# Patient Record
Sex: MALE | Race: WHITE | NOT HISPANIC OR LATINO | Employment: FULL TIME | URBAN - METROPOLITAN AREA
[De-identification: names, ages, dates, MRNs, and addresses within clinical notes are randomized per-mention and may not be internally consistent; named-entity substitution may affect disease eponyms.]

---

## 2020-10-26 ENCOUNTER — OFFICE VISIT (OUTPATIENT)
Dept: INTERNAL MEDICINE | Facility: CLINIC | Age: 35
End: 2020-10-26

## 2020-10-26 VITALS
RESPIRATION RATE: 17 BRPM | HEART RATE: 101 BPM | TEMPERATURE: 98.3 F | WEIGHT: 185 LBS | SYSTOLIC BLOOD PRESSURE: 140 MMHG | BODY MASS INDEX: 27.4 KG/M2 | HEIGHT: 69 IN | DIASTOLIC BLOOD PRESSURE: 88 MMHG | OXYGEN SATURATION: 97 %

## 2020-10-26 DIAGNOSIS — Z13.6 ENCOUNTER FOR LIPID SCREENING FOR CARDIOVASCULAR DISEASE: ICD-10-CM

## 2020-10-26 DIAGNOSIS — Z13.220 ENCOUNTER FOR LIPID SCREENING FOR CARDIOVASCULAR DISEASE: ICD-10-CM

## 2020-10-26 DIAGNOSIS — K58.2 IRRITABLE BOWEL SYNDROME WITH BOTH CONSTIPATION AND DIARRHEA: ICD-10-CM

## 2020-10-26 DIAGNOSIS — Z76.89 ENCOUNTER TO ESTABLISH CARE: Primary | ICD-10-CM

## 2020-10-26 DIAGNOSIS — L03.115 CELLULITIS OF RIGHT LEG: ICD-10-CM

## 2020-10-26 DIAGNOSIS — R03.0 BLOOD PRESSURE ELEVATED WITHOUT HISTORY OF HTN: ICD-10-CM

## 2020-10-26 DIAGNOSIS — N31.9 NEUROGENIC BLADDER: ICD-10-CM

## 2020-10-26 DIAGNOSIS — Q05.7 SPINA BIFIDA OF LUMBAR REGION WITHOUT HYDROCEPHALUS (HCC): ICD-10-CM

## 2020-10-26 DIAGNOSIS — Z00.00 HEALTHCARE MAINTENANCE: ICD-10-CM

## 2020-10-26 PROCEDURE — 99204 OFFICE O/P NEW MOD 45 MIN: CPT | Performed by: NURSE PRACTITIONER

## 2020-10-26 RX ORDER — CLINDAMYCIN HYDROCHLORIDE 150 MG/1
150 CAPSULE ORAL 3 TIMES DAILY
COMMUNITY
End: 2020-12-07

## 2020-10-26 NOTE — PROGRESS NOTES
"Subjective   Alonzo Haque is a 35 y.o. male.   Chief Complaint   Patient presents with   • Establish Care     Pt presents here today to establish care.   Irritable bowel, needs order for wheelchair measurements, spina bifida, cellulitis of right lower extremity    Patient presents to establish care.  This is a 35-year-old male.  This patient is new to me.  Reports that he does not have a previous PCP.  Reports that he was released from skilled nursing last week.    Prior to his release from skilled nursing he was being treated by the physicians there for right lower extremity cellulitis.  Per this documentation, he was treated with IM Rocephin as well as a course of p.o. Bactrim.  When he was released he went home with a 7-day course of clindamycin and states that he has about 4 days left of this course of treatment.  He denies fever or chills and states that the redness is starting to subside slightly.  He does have some fluid-filled blisters to the posterior ankle which \"have not healed up yet.\"    Endorses a history of hyperlipidemia and states \"at some point I was prescribed atorvastatin but I did not take this as I was told it could cause muscle cramps.\"    He has alternating constipation and diarrhea.  Does not take a daily probiotic.  Denies seeing blood or mucus in his stool.    He has spina bifida and reports that he has been in a wheelchair for the past 24 years.  Reports that he needs in order to get measured for a new wheelchair today.  He does have neurogenic bladder related to this underlying issue and self caths.  Prior to his present stay states that he was following with a urologist regularly but needs a referral for a new urologist today.    Reports that he has had a flu shot this year already.    Reports that he needs a letter written stating that he can \"make my own financial decisions.\"  Reports that prior to being in skilled nursing he was having his mother make financial decisions.  Denies any psychiatric history.  States " "that as a child he had a learning disability.  He does drive a car and is living on his own now.  Denies any known underlying medical or psychiatric issues that would impede his ability to make financial decisions.    Denies development of any other new issues today.       The following portions of the patient's history were reviewed and updated as appropriate: allergies, current medications, past family history, past medical history, past social history, past surgical history and problem list.    Review of Systems   Constitutional: Negative for activity change, chills, fatigue, fever, unexpected weight gain and unexpected weight loss.   HENT: Negative for congestion, hearing loss, postnasal drip, sinus pressure, sneezing, sore throat, swollen glands and tinnitus.    Eyes: Negative for photophobia, pain and visual disturbance.   Respiratory: Negative for cough, chest tightness, shortness of breath and wheezing.    Cardiovascular: Negative for chest pain, palpitations and leg swelling.   Gastrointestinal: Negative for abdominal distention, abdominal pain, constipation, diarrhea, nausea and vomiting.   Endocrine: Negative for polydipsia, polyphagia and polyuria.   Genitourinary: Negative for dysuria, frequency, hematuria and urgency.   Musculoskeletal: Positive for joint swelling (  Right ankle related to cellulitis  ).   Skin: Positive for color change.   Neurological: Negative for dizziness, weakness, numbness and headache.   All other systems reviewed and are negative.      Objective    /88   Pulse 101   Temp 98.3 °F (36.8 °C) (Oral)   Resp 17   Ht 175.3 cm (69\")   Wt 83.9 kg (185 lb)   SpO2 97%   BMI 27.32 kg/m²     Physical Exam  Vitals signs and nursing note reviewed.   Constitutional:       General: He is not in acute distress.     Appearance: Normal appearance. He is well-developed. He is not ill-appearing, toxic-appearing or diaphoretic.   HENT:      Head: Normocephalic and atraumatic.      Nose: " Nose normal.      Mouth/Throat:      Mouth: Mucous membranes are moist.      Pharynx: Oropharynx is clear.   Eyes:      Extraocular Movements: Extraocular movements intact.      Conjunctiva/sclera: Conjunctivae normal.      Pupils: Pupils are equal, round, and reactive to light.   Neck:      Musculoskeletal: Normal range of motion and neck supple. No neck rigidity or muscular tenderness.      Vascular: No carotid bruit.   Cardiovascular:      Rate and Rhythm: Normal rate and regular rhythm.      Pulses: Normal pulses.      Heart sounds: Normal heart sounds.   Pulmonary:      Effort: Pulmonary effort is normal. No respiratory distress.      Breath sounds: Normal breath sounds. No stridor. No wheezing, rhonchi or rales.   Chest:      Chest wall: No tenderness.   Abdominal:      General: Bowel sounds are normal. There is no distension.      Palpations: Abdomen is soft. There is no mass.      Tenderness: There is no abdominal tenderness. There is no right CVA tenderness, left CVA tenderness, guarding or rebound.      Hernia: No hernia is present.   Musculoskeletal: Normal range of motion.      Comments: Atrophied muscles, bilateral thighs and calves   Lymphadenopathy:      Cervical: No cervical adenopathy.   Skin:     General: Skin is warm and dry.      Capillary Refill: Capillary refill takes less than 2 seconds.      Comments: Right lower leg/ankle erythema.  Right posterior ankle fluid-filled blistering.  No purulent discharge.   Neurological:      General: No focal deficit present.      Mental Status: He is alert and oriented to person, place, and time.   Psychiatric:         Mood and Affect: Mood normal.         Behavior: Behavior normal.         Thought Content: Thought content normal.         Judgment: Judgment normal.       Current outpatient and discharge medications have been reconciled for the patient.  Reviewed by: YUMIKO Diamond      Assessment/Plan   Diagnoses and all orders for this visit:    1.  Encounter to establish care (Primary)    2. Blood pressure elevated without history of HTN  -     CBC No Differential  -     Comprehensive metabolic panel  -     Lipid panel  -     TSH Rfx On Abnormal To Free T4    3. Healthcare maintenance  -     CBC No Differential  -     Comprehensive metabolic panel  -     TSH Rfx On Abnormal To Free T4  -     Hepatitis C antibody    4. Encounter for lipid screening for cardiovascular disease  -     Lipid panel    5. Spina bifida of lumbar region without hydrocephalus (CMS/HCC)    6. Neurogenic bladder  -     Ambulatory Referral to Urology    7. Cellulitis of right leg  -     Ambulatory Referral to Wound Clinic    8. Irritable bowel syndrome with both constipation and diarrhea    Other orders  -     Cancel: Urinalysis With Culture If Indicated - Urine, Clean Catch      -Establish care: Reviewed patient's stated medical, surgical and social history.  Provided health maintenance recommendations.  One-time hep C screen today.  He has had his flu shot this year already.    -Blood pressure slightly high today at 140/88.  I have asked him to monitor blood pressure a few times per week for goal of less than 130/80 and to lower dietary sodium intake.    -Screening for hyperlipidemia: Lipid panel today.  Reports that he has been prescribed atorvastatin in the past but did not take this due to potential side effects.  We will check his lipid panel today and adjust therapy if needed.    -Spina bifida: Reports that he has had to use a wheelchair for the past 24 years due to mobility related to underlying spina bifida.  Current wheelchair is broken down and he requests orders for measurements for new wheelchair.  We will get these orders faxed to medical supply store.  Patient is unable to safely use a cane or walker due to spina bifida diagnosis and wheelchair is needed to assist with daily living activities inside the home.  Patient has upper body strength and mental capability to propel  the wheelchair inside the home.    -He has associated neurogenic bladder related to underlying spina bifida.  Referral to urology is entered.  He does a straight cath.    -Cellulitis of right leg: He will finish out the complete course of clindamycin.  I have referred to wound care clinic for further evaluation and treatment regarding possible dressings.    -Irritable bowel syndrome: I recommended a daily probiotic.  He may use MiraLAX if needed upon constipation.    -He requested a letter stating that he has cognitive ability to make his own financial decisions.  I have no prior documentation from any previous primary care providers or specialists.  Based upon my conversation and assessment with patient today I believe it is reasonable to assume that he may make his own financial decisions.  Reports that he is going to be living on his own and does drive.  I have written a letter accordingly and this is provided to the patient, signed.    We will contact patient with his lab results and any further recommendations.  Follow-up as needed and in 6 months for a physical.

## 2020-10-27 LAB
ALBUMIN SERPL-MCNC: 3.9 G/DL (ref 3.5–5.2)
ALBUMIN/GLOB SERPL: 1.4 G/DL
ALP SERPL-CCNC: 71 U/L (ref 39–117)
ALT SERPL-CCNC: 18 U/L (ref 1–41)
AST SERPL-CCNC: 17 U/L (ref 1–40)
BILIRUB SERPL-MCNC: 0.2 MG/DL (ref 0–1.2)
BUN SERPL-MCNC: 8 MG/DL (ref 6–20)
BUN/CREAT SERPL: 11.9 (ref 7–25)
CALCIUM SERPL-MCNC: 9 MG/DL (ref 8.6–10.5)
CHLORIDE SERPL-SCNC: 103 MMOL/L (ref 98–107)
CHOLEST SERPL-MCNC: 209 MG/DL (ref 0–200)
CO2 SERPL-SCNC: 26.5 MMOL/L (ref 22–29)
CREAT SERPL-MCNC: 0.67 MG/DL (ref 0.76–1.27)
ERYTHROCYTE [DISTWIDTH] IN BLOOD BY AUTOMATED COUNT: 12.2 % (ref 12.3–15.4)
GLOBULIN SER CALC-MCNC: 2.8 GM/DL
GLUCOSE SERPL-MCNC: 89 MG/DL (ref 65–99)
HCT VFR BLD AUTO: 41.2 % (ref 37.5–51)
HCV AB S/CO SERPL IA: <0.1 S/CO RATIO (ref 0–0.9)
HDLC SERPL-MCNC: 53 MG/DL (ref 40–60)
HGB BLD-MCNC: 13.6 G/DL (ref 13–17.7)
LDLC SERPL CALC-MCNC: 143 MG/DL (ref 0–100)
MCH RBC QN AUTO: 29.5 PG (ref 26.6–33)
MCHC RBC AUTO-ENTMCNC: 33 G/DL (ref 31.5–35.7)
MCV RBC AUTO: 89.4 FL (ref 79–97)
PLATELET # BLD AUTO: 425 10*3/MM3 (ref 140–450)
POTASSIUM SERPL-SCNC: 4.4 MMOL/L (ref 3.5–5.2)
PROT SERPL-MCNC: 6.7 G/DL (ref 6–8.5)
RBC # BLD AUTO: 4.61 10*6/MM3 (ref 4.14–5.8)
SODIUM SERPL-SCNC: 139 MMOL/L (ref 136–145)
TRIGL SERPL-MCNC: 70 MG/DL (ref 0–150)
TSH SERPL DL<=0.005 MIU/L-ACNC: 1.56 UIU/ML (ref 0.27–4.2)
VLDLC SERPL CALC-MCNC: 13 MG/DL (ref 5–40)
WBC # BLD AUTO: 5.91 10*3/MM3 (ref 3.4–10.8)

## 2020-10-27 NOTE — PROGRESS NOTES
Please notify patient that his blood count is stable with no anemia.  Metabolic panel stable including kidney, liver function and electrolytes.  Cholesterol panel showing slightly elevated total cholesterol and high LDL cholesterol although nonfasting sample may have contributed to this result.  Please watch dietary intake of carbs, sugars and fats and we will recheck regularly.  Thyroid is normal.  Please do follow-up as soon as possible with the wound care clinic per the referral that I entered yesterday.  I had wanted him to schedule a 6-month follow-up for physical, if he is agreeable please make this appointment for the future.  Thank you

## 2020-10-30 ENCOUNTER — OFFICE VISIT (OUTPATIENT)
Dept: WOUND CARE | Facility: HOSPITAL | Age: 35
End: 2020-10-30

## 2020-10-30 PROCEDURE — G0463 HOSPITAL OUTPT CLINIC VISIT: HCPCS

## 2020-11-05 ENCOUNTER — OFFICE VISIT (OUTPATIENT)
Dept: WOUND CARE | Facility: HOSPITAL | Age: 35
End: 2020-11-05

## 2020-11-05 PROCEDURE — G0463 HOSPITAL OUTPT CLINIC VISIT: HCPCS

## 2020-11-12 ENCOUNTER — OFFICE VISIT (OUTPATIENT)
Dept: WOUND CARE | Facility: HOSPITAL | Age: 35
End: 2020-11-12

## 2020-11-12 PROCEDURE — G0463 HOSPITAL OUTPT CLINIC VISIT: HCPCS

## 2020-12-07 ENCOUNTER — HOSPITAL ENCOUNTER (EMERGENCY)
Facility: HOSPITAL | Age: 35
Discharge: HOME | End: 2020-12-07
Attending: EMERGENCY MEDICINE | Admitting: EMERGENCY MEDICINE

## 2020-12-07 ENCOUNTER — APPOINTMENT (OUTPATIENT)
Dept: CT IMAGING | Facility: HOSPITAL | Age: 35
End: 2020-12-07

## 2020-12-07 VITALS
DIASTOLIC BLOOD PRESSURE: 85 MMHG | SYSTOLIC BLOOD PRESSURE: 124 MMHG | TEMPERATURE: 100.4 F | OXYGEN SATURATION: 96 % | RESPIRATION RATE: 18 BRPM | HEART RATE: 98 BPM

## 2020-12-07 DIAGNOSIS — N39.0 ACUTE UTI: Primary | ICD-10-CM

## 2020-12-07 DIAGNOSIS — R31.9 HEMATURIA, UNSPECIFIED TYPE: ICD-10-CM

## 2020-12-07 LAB
ALBUMIN SERPL-MCNC: 3.7 G/DL (ref 3.5–5.2)
ALBUMIN/GLOB SERPL: 1.2 G/DL
ALP SERPL-CCNC: 75 U/L (ref 39–117)
ALT SERPL W P-5'-P-CCNC: 52 U/L (ref 1–41)
ANION GAP SERPL CALCULATED.3IONS-SCNC: 8 MMOL/L (ref 5–15)
AST SERPL-CCNC: 37 U/L (ref 1–40)
BACTERIA UR QL AUTO: ABNORMAL /HPF
BASOPHILS # BLD AUTO: 0.08 10*3/MM3 (ref 0–0.2)
BASOPHILS NFR BLD AUTO: 0.4 % (ref 0–1.5)
BILIRUB SERPL-MCNC: 0.4 MG/DL (ref 0–1.2)
BILIRUB UR QL STRIP: NEGATIVE
BUN SERPL-MCNC: 18 MG/DL (ref 6–20)
BUN/CREAT SERPL: 23.7 (ref 7–25)
CALCIUM SPEC-SCNC: 8.6 MG/DL (ref 8.6–10.5)
CHLORIDE SERPL-SCNC: 99 MMOL/L (ref 98–107)
CLARITY UR: ABNORMAL
CO2 SERPL-SCNC: 25 MMOL/L (ref 22–29)
COLOR UR: YELLOW
CREAT SERPL-MCNC: 0.76 MG/DL (ref 0.76–1.27)
D-LACTATE SERPL-SCNC: 1.3 MMOL/L (ref 0.5–2)
DEPRECATED RDW RBC AUTO: 38.7 FL (ref 37–54)
EOSINOPHIL # BLD AUTO: 0 10*3/MM3 (ref 0–0.4)
EOSINOPHIL NFR BLD AUTO: 0 % (ref 0.3–6.2)
ERYTHROCYTE [DISTWIDTH] IN BLOOD BY AUTOMATED COUNT: 12.2 % (ref 12.3–15.4)
GFR SERPL CREATININE-BSD FRML MDRD: 117 ML/MIN/1.73
GLOBULIN UR ELPH-MCNC: 3.2 GM/DL
GLUCOSE SERPL-MCNC: 108 MG/DL (ref 65–99)
GLUCOSE UR STRIP-MCNC: NEGATIVE MG/DL
HCT VFR BLD AUTO: 38.1 % (ref 37.5–51)
HGB BLD-MCNC: 13.5 G/DL (ref 13–17.7)
HGB UR QL STRIP.AUTO: ABNORMAL
HYALINE CASTS UR QL AUTO: ABNORMAL /LPF
IMM GRANULOCYTES # BLD AUTO: 0.14 10*3/MM3 (ref 0–0.05)
IMM GRANULOCYTES NFR BLD AUTO: 0.7 % (ref 0–0.5)
KETONES UR QL STRIP: NEGATIVE
LEUKOCYTE ESTERASE UR QL STRIP.AUTO: ABNORMAL
LYMPHOCYTES # BLD AUTO: 0.43 10*3/MM3 (ref 0.7–3.1)
LYMPHOCYTES NFR BLD AUTO: 2.1 % (ref 19.6–45.3)
MCH RBC QN AUTO: 31.2 PG (ref 26.6–33)
MCHC RBC AUTO-ENTMCNC: 35.4 G/DL (ref 31.5–35.7)
MCV RBC AUTO: 88 FL (ref 79–97)
MONOCYTES # BLD AUTO: 1.09 10*3/MM3 (ref 0.1–0.9)
MONOCYTES NFR BLD AUTO: 5.2 % (ref 5–12)
NEUTROPHILS NFR BLD AUTO: 19.17 10*3/MM3 (ref 1.7–7)
NEUTROPHILS NFR BLD AUTO: 91.6 % (ref 42.7–76)
NITRITE UR QL STRIP: POSITIVE
NRBC BLD AUTO-RTO: 0 /100 WBC (ref 0–0.2)
PH UR STRIP.AUTO: 6 [PH] (ref 5–8)
PLATELET # BLD AUTO: 145 10*3/MM3 (ref 140–450)
PMV BLD AUTO: 10.9 FL (ref 6–12)
POTASSIUM SERPL-SCNC: 3.6 MMOL/L (ref 3.5–5.2)
PROT SERPL-MCNC: 6.9 G/DL (ref 6–8.5)
PROT UR QL STRIP: ABNORMAL
RBC # BLD AUTO: 4.33 10*6/MM3 (ref 4.14–5.8)
RBC # UR: ABNORMAL /HPF
REF LAB TEST METHOD: ABNORMAL
SODIUM SERPL-SCNC: 132 MMOL/L (ref 136–145)
SP GR UR STRIP: 1.01 (ref 1–1.03)
SQUAMOUS #/AREA URNS HPF: ABNORMAL /HPF
UROBILINOGEN UR QL STRIP: ABNORMAL
WBC # BLD AUTO: 20.91 10*3/MM3 (ref 3.4–10.8)
WBC UR QL AUTO: ABNORMAL /HPF

## 2020-12-07 PROCEDURE — 99283 EMERGENCY DEPT VISIT LOW MDM: CPT

## 2020-12-07 PROCEDURE — 36415 COLL VENOUS BLD VENIPUNCTURE: CPT

## 2020-12-07 PROCEDURE — P9612 CATHETERIZE FOR URINE SPEC: HCPCS

## 2020-12-07 PROCEDURE — 74176 CT ABD & PELVIS W/O CONTRAST: CPT

## 2020-12-07 PROCEDURE — 96365 THER/PROPH/DIAG IV INF INIT: CPT

## 2020-12-07 PROCEDURE — 83605 ASSAY OF LACTIC ACID: CPT | Performed by: NURSE PRACTITIONER

## 2020-12-07 PROCEDURE — 85025 COMPLETE CBC W/AUTO DIFF WBC: CPT | Performed by: NURSE PRACTITIONER

## 2020-12-07 PROCEDURE — 25010000003 CEFTRIAXONE PER 250 MG: Performed by: NURSE PRACTITIONER

## 2020-12-07 PROCEDURE — 81001 URINALYSIS AUTO W/SCOPE: CPT | Performed by: NURSE PRACTITIONER

## 2020-12-07 PROCEDURE — 80053 COMPREHEN METABOLIC PANEL: CPT | Performed by: NURSE PRACTITIONER

## 2020-12-07 RX ORDER — SODIUM CHLORIDE 0.9 % (FLUSH) 0.9 %
10 SYRINGE (ML) INJECTION AS NEEDED
Status: DISCONTINUED | OUTPATIENT
Start: 2020-12-07 | End: 2020-12-08 | Stop reason: HOSPADM

## 2020-12-07 RX ORDER — ONDANSETRON 4 MG/1
4 TABLET, ORALLY DISINTEGRATING ORAL EVERY 6 HOURS PRN
Qty: 12 TABLET | Refills: 0 | Status: SHIPPED | OUTPATIENT
Start: 2020-12-07 | End: 2021-10-04

## 2020-12-07 RX ORDER — CEFTRIAXONE SODIUM 2 G/50ML
2 INJECTION, SOLUTION INTRAVENOUS ONCE
Status: COMPLETED | OUTPATIENT
Start: 2020-12-07 | End: 2020-12-07

## 2020-12-07 RX ORDER — PROMETHAZINE HYDROCHLORIDE 25 MG/1
25 TABLET ORAL EVERY 6 HOURS PRN
Qty: 12 TABLET | Refills: 0 | Status: SHIPPED | OUTPATIENT
Start: 2020-12-07 | End: 2021-10-04

## 2020-12-07 RX ORDER — CEFUROXIME AXETIL 500 MG/1
500 TABLET ORAL 2 TIMES DAILY
Qty: 14 TABLET | Refills: 0 | Status: SHIPPED | OUTPATIENT
Start: 2020-12-07 | End: 2021-10-04

## 2020-12-07 RX ADMIN — CEFTRIAXONE SODIUM 2 G: 2 INJECTION, SOLUTION INTRAVENOUS at 22:37

## 2020-12-07 RX ADMIN — SODIUM CHLORIDE 1000 ML: 9 INJECTION, SOLUTION INTRAVENOUS at 21:43

## 2020-12-08 NOTE — ED PROVIDER NOTES
EMERGENCY DEPARTMENT ENCOUNTER    Room Number:  08/08  Date of encounter:  12/7/2020  PCP: Elaine Cheng APRN  Historian: Tawnya        PPE    Patient was placed in face mask in first look. Patient was wearing facemask when I entered the room and throughout our encounter. I wore full protective equipment throughout this patient encounter including a face mask, and gloves. Hand hygiene was performed before donning protective equipment and after removal when leaving the room.        HPI:  Chief Complaint: Back pain  A complete HPI/ROS/PMH/PSH/SH/FH are unobtainable due to: Nothing    Context: Alonzo Haque is a 35 y.o. male who arrives to the ED via private vehicle from home.  Patient presents with c/o intermittent, mild, right flank and lower back pain that began yesterday.   Patient also complains of self cathing and having blood in his urine since yesterday, nausea, vomiting and fever and chills.  Patient denies chest pain, shortness of breath, any other symptoms.  Patient states that he has a history of neurogenic bladder and self caths 3-4 times a day.  He states yesterday he noticed a large amount of blood.  He states he does get frequent urinary tract infections.  Patient states that nothing makes the symptoms better and nothing worsens symptoms.          PAST MEDICAL HISTORY  Active Ambulatory Problems     Diagnosis Date Noted   • Spina bifida of lumbar region without hydrocephalus (CMS/[facility]) 10/26/2020   • Neurogenic bladder 10/26/2020   • Irritable bowel syndrome with both constipation and diarrhea 10/26/2020     Resolved Ambulatory Problems     Diagnosis Date Noted   • No Resolved Ambulatory Problems     Past Medical History:   Diagnosis Date   • Abdominal pain    • Back pain    • Bilateral swelling of feet and ankles    • Constipation    • Diarrhea    • Difficulty walking    • History of chickenpox    • Muscle cramps    • Nervousness    • Urinary incontinence, post-void dribbling          PAST SURGICAL  HISTORY  No past surgical history on file.      FAMILY HISTORY  Family History   Problem Relation Age of Onset   • Lung cancer Father          SOCIAL HISTORY  Social History     Socioeconomic History   • Marital status: Single     Spouse name: Not on file   • Number of children: Not on file   • Years of education: Not on file   • Highest education level: Not on file   Tobacco Use   • Smoking status: Current Every Day Smoker     Packs/day: 0.25     Types: Cigarettes     Start date: 2001   • Smokeless tobacco: Never Used   Substance and Sexual Activity   • Alcohol use: Never     Frequency: Never   • Drug use: Not Currently         ALLERGIES  Latex and Penicillins        REVIEW OF SYSTEMS  Review of Systems     All systems reviewed and negative except for those discussed in HPI.        PHYSICAL EXAM    ED Triage Vitals [12/07/20 2055]   Temp Heart Rate Resp BP SpO2   100.4 °F (38 °C) 112 18 138/81 98 %       Physical Exam  GENERAL: Well appearing, non-toxic appearing, not distressed  HENT: normocephalic, atraumatic  EYES: no scleral icterus, PERRL  CV: regular rhythm, tachycardic, no murmur  RESPIRATORY: normal effort, CTAB  ABDOMEN: soft, normal bowel sounds, and nontender  No CVA tenderness le, right flank and lower back tenderness  MUSCULOSKELETAL: no deformity  NEURO: alert, moves all extremities, follows commands, mental status normal/baseline  SKIN: warm, dry, no rash   Psych: Appropriate mood and affect  Nursing notes and vital signs reviewed      LAB RESULTS  Recent Results (from the past 24 hour(s))   Comprehensive Metabolic Panel    Collection Time: 12/07/20  9:42 PM    Specimen: Blood   Result Value Ref Range    Glucose 108 (H) 65 - 99 mg/dL    BUN 18 6 - 20 mg/dL    Creatinine 0.76 0.76 - 1.27 mg/dL    Sodium 132 (L) 136 - 145 mmol/L    Potassium 3.6 3.5 - 5.2 mmol/L    Chloride 99 98 - 107 mmol/L    CO2 25.0 22.0 - 29.0 mmol/L    Calcium 8.6 8.6 - 10.5 mg/dL    Total Protein 6.9 6.0 - 8.5 g/dL    Albumin  3.70 3.50 - 5.20 g/dL    ALT (SGPT) 52 (H) 1 - 41 U/L    AST (SGOT) 37 1 - 40 U/L    Alkaline Phosphatase 75 39 - 117 U/L    Total Bilirubin 0.4 0.0 - 1.2 mg/dL    eGFR Non African Amer 117 >60 mL/min/1.73    Globulin 3.2 gm/dL    A/G Ratio 1.2 g/dL    BUN/Creatinine Ratio 23.7 7.0 - 25.0    Anion Gap 8.0 5.0 - 15.0 mmol/L   CBC Auto Differential    Collection Time: 12/07/20  9:42 PM    Specimen: Blood   Result Value Ref Range    WBC 20.91 (H) 3.40 - 10.80 10*3/mm3    RBC 4.33 4.14 - 5.80 10*6/mm3    Hemoglobin 13.5 13.0 - 17.7 g/dL    Hematocrit 38.1 37.5 - 51.0 %    MCV 88.0 79.0 - 97.0 fL    MCH 31.2 26.6 - 33.0 pg    MCHC 35.4 31.5 - 35.7 g/dL    RDW 12.2 (L) 12.3 - 15.4 %    RDW-SD 38.7 37.0 - 54.0 fl    MPV 10.9 6.0 - 12.0 fL    Platelets 145 140 - 450 10*3/mm3    Neutrophil % 91.6 (H) 42.7 - 76.0 %    Lymphocyte % 2.1 (L) 19.6 - 45.3 %    Monocyte % 5.2 5.0 - 12.0 %    Eosinophil % 0.0 (L) 0.3 - 6.2 %    Basophil % 0.4 0.0 - 1.5 %    Immature Grans % 0.7 (H) 0.0 - 0.5 %    Neutrophils, Absolute 19.17 (H) 1.70 - 7.00 10*3/mm3    Lymphocytes, Absolute 0.43 (L) 0.70 - 3.10 10*3/mm3    Monocytes, Absolute 1.09 (H) 0.10 - 0.90 10*3/mm3    Eosinophils, Absolute 0.00 0.00 - 0.40 10*3/mm3    Basophils, Absolute 0.08 0.00 - 0.20 10*3/mm3    Immature Grans, Absolute 0.14 (H) 0.00 - 0.05 10*3/mm3    nRBC 0.0 0.0 - 0.2 /100 WBC   Urinalysis With Microscopic If Indicated (No Culture) - Urine, Catheter    Collection Time: 12/07/20  9:52 PM    Specimen: Urine, Catheter   Result Value Ref Range    Color, UA Yellow Yellow, Straw    Appearance, UA Cloudy (A) Clear    pH, UA 6.0 5.0 - 8.0    Specific Gravity, UA 1.009 1.005 - 1.030    Glucose, UA Negative Negative    Ketones, UA Negative Negative    Bilirubin, UA Negative Negative    Blood, UA Large (3+) (A) Negative    Protein,  mg/dL (2+) (A) Negative    Leuk Esterase, UA Large (3+) (A) Negative    Nitrite, UA Positive (A) Negative    Urobilinogen, UA 0.2 E.U./dL 0.2 -  1.0 E.U./dL   Urinalysis, Microscopic Only - Urine, Catheter    Collection Time: 12/07/20  9:52 PM    Specimen: Urine, Catheter   Result Value Ref Range    RBC, UA Too Numerous to Count (A) None Seen, 0-2 /HPF    WBC, UA Too Numerous to Count (A) None Seen, 0-2 /HPF    Bacteria, UA 4+ (A) None Seen /HPF    Squamous Epithelial Cells, UA 0-2 None Seen, 0-2 /HPF    Hyaline Casts, UA None Seen None Seen /LPF    Methodology Automated Microscopy    Lactic Acid, Plasma    Collection Time: 12/07/20 10:23 PM    Specimen: Blood   Result Value Ref Range    Lactate 1.3 0.5 - 2.0 mmol/L       Ordered the above labs and independently reviewed the results.      RADIOLOGY  Ct Abdomen Pelvis Without Contrast    Result Date: 12/7/2020  CT ABDOMEN AND PELVIS WITHOUT CONTRAST:   HISTORY: Right flank pain  TECHNIQUE:  Contiguous 5 mm axial images were performed through the abdomen and pelvis without intravenous contrast and without oral contrast. Multiplanar reformatted images were reconstructed from the helical source data. Radiation dose reduction techniques were utilized, including automated exposure control and exposure modulation based on body size.   COMPARISON:  None  FINDINGS:   Note that the sensitivity for lesion detection in organs is diminished in the absence of intravenous contrast.  Lung bases: Punctate calcified granuloma in the right lung base. Scarring versus atelectasis at the bilateral lung bases. No pleural effusions.  Liver: Unremarkable.  Gallbladder: No calcified gallstones. No intrahepatic or extrahepatic biliary tree dilatation.  Spleen: Unremarkable. Normal in size.    Pancreas: Unremarkable.  Adrenal Glands: Unremarkable.  Kidneys/Ureter/Urinary Bladder: Horseshoe kidney. No hydronephrosis. No renal calculi or ureteral calculi are seen. No ureteral dilatation. The urinary bladder is unremarkable.  Vasculature: Abdominal aorta demonstrates normal caliber without evidence of aneurysm. IVC is unremarkable.  Bowel  and Mesentery:The stomach is unremarkable. The visualized bowel is normal in caliber without obstruction. Moderate stool within the distal sigmoid colon and rectum. There is no inflammatory change within the mesentery. No ascites.  No free air.  Lymphadenopathy: No retroperitoneal, mesenteric or pelvic lymphadenopathy.  Pelvic organs: Calcifications in the prostate  Peripheral soft tissues: A  shunt catheter extends along the right anterior chest and abdominal wall and enters the intracranial cavity and in the mid abdomen and terminates in the left lower quadrant.. Small fat-containing umbilical hernia. Atrophy of the bilateral gluteal muscular structure.  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 No acute osseous abnormality. No aggressive osseous lesions.         1.  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 2.  Thickened urinary bladder walls which may be due to decompression or cystitis. Correlate with urinalysis. 3.  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 Further evaluation with a lumbar spine MRI exam can be obtained, as clinically indicated..  This report was finalized on 12/7/2020 10:49 PM by Dr. Austin Day M.D.        I ordered the above noted radiological studies and viewed the images on the PACS system.       MEDICAL RECORD REVIEW  Medical records reviewed in epic      PROCEDURES    Procedures        DIFFERENTIAL DIAGNOSIS  Differential diagnosis include but are not limited to the following: Urinary tract infection, pyelonephritis, kidney stone, hematuria      PROGRESS, DATA ANALYSIS, CONSULTS, AND MEDICAL DECISION MAKING        ED Course as of Dec 07 2321   Mon Dec 07, 2020   2225 Discussed pertinent information from history and physical exam with patient.  Discussed differential diagnosis and plan for ED  evaluation/work-up and treatment including labs, UA, CT abdomen pelvis.  All questions answered.  Patient is agreeable with this plan.        [MS]   2225 Discussed with patient his penicillin allergy, he states he has had that since childhood.  He states that he has received Rocephin in the past and has not had any reactions after receiving that.    [MS]   2226 WBC(!): 20.91 [MS]   2226 Leukocytes, UA(!): Large (3+) [MS]   2226 Nitrite, UA(!): Positive [MS]   2226 RBC, UA(!): Too Numerous to Count [MS]   2226 WBC, UA(!): Too Numerous to Count [MS]   2226 Bacteria, UA(!): 4+ [MS]   2258 Reviewed pt's history and workup with Dr. Chiu.  After a bedside evaluation, they agree with the plan of care.          [MS]   2320 Discussed with patient results, including elevated white count, urinary tract infection.  Patient was offered admission based on these lab findings, however he states that he would prefer to go home and does not want to be admitted at this time.  Patient was given strict return to ER precautions.  Urine was sent for culture.    [MS]      ED Course User Index  [MS] Mimi Abbott APRN     Discussed plan for discharge, as there is no emergent indication for admission. Pt/family is agreeable and understands need for follow up and repeat testing.  Pt is aware that discharge does not mean that nothing is wrong but it indicates no emergency is present that requires admission and they must continue care with follow-up as given below or physician of their choice.   Patient/Family voiced understanding of above instructions.  Patient discharged in stable condition.    DIAGNOSIS  Final diagnoses:   Acute UTI   Hematuria, unspecified type       FOLLOW UP   Elaine Cheng APRN  [address] [facility] [address]  [facility] [address] [phone]    Call in 1 day        RX     Medication List      New Prescriptions    cefuroxime 500 MG tablet  Commonly known as: CEFTIN  Take 1 tablet by mouth 2 (Two) Times a  Day.     ondansetron ODT 4 MG disintegrating tablet  Commonly known as: ZOFRAN-ODT  Place 1 tablet on the tongue Every 6 (Six) Hours As Needed for Nausea or Vomiting.     promethazine 25 MG tablet  Commonly known as: PHENERGAN  Take 1 tablet by mouth Every 6 (Six) Hours As Needed for Nausea or Vomiting.        Stop    clindamycin 150 MG capsule  Commonly known as: CLEOCIN           Where to Get Your Medications      You can get these medications from any pharmacy    Bring a paper prescription for each of these medications  · cefuroxime 500 MG tablet  · ondansetron ODT 4 MG disintegrating tablet  · promethazine 25 MG tablet           MEDICATIONS GIVEN IN ED    Medications   sodium chloride 0.9 % flush 10 mL (has no administration in time range)   sodium chloride 0.9 % bolus 1,000 mL (0 mL Intravenous Stopped 12/7/20 2317)   cefTRIAXone (ROCEPHIN) IVPB 2 g (0 g Intravenous Stopped 12/7/20 2317)           COURSE & MEDICAL DECISION MAKING  Any/All labs and Any/All Imaging studies that were ordered were reviewed and are noted above.  Results were reviewed/discussed with the patient and they were also made aware of online assess.   Pt also made aware that some labs, such as cultures, will not be resulted during ER visit and follow up with PMD is necessary.        Mimi Abbott, APRN  12/07/20 1110

## 2020-12-08 NOTE — ED NOTES
Pt via PV from home escorted by family with c/o fever, mid-lower back pain, faitgue, N/V, blood in urine.   Pt reports taking tylenol this morning and prior to arrival but was unable to keep the most recent dose down.     Mask placed on pt, staff wearing appropriate PPE.     Leola Shah, RN  12/07/20 7300

## 2020-12-08 NOTE — ED PROVIDER NOTES
Brief history of present illness: 35-year-old male presents with hematuria and fever.  Patient concerned about UTI.  Patient does have a history of CP and self caths.  Patient did have 1 episode of vomiting.  Otherwise no cough, chest pain, loss of taste or smell.    Physical exam:    ED Triage Vitals [12/07/20 2055]   Temp Heart Rate Resp BP SpO2   100.4 °F (38 °C) 112 18 138/81 98 %      Temp src Heart Rate Source Patient Position BP Location FiO2 (%)   Tympanic -- -- -- --     Ill-appearing though not overtly toxic.  Patient is tachycardic but pink warm and well-perfused.  No respiratory distress is noted.  Abdomen is soft and nontender.  Normal mood and affect.    MDM:    Results for orders placed or performed during the hospital encounter of 12/07/20   Comprehensive Metabolic Panel    Specimen: Blood   Result Value Ref Range    Glucose 108 (H) 65 - 99 mg/dL    BUN 18 6 - 20 mg/dL    Creatinine 0.76 0.76 - 1.27 mg/dL    Sodium 132 (L) 136 - 145 mmol/L    Potassium 3.6 3.5 - 5.2 mmol/L    Chloride 99 98 - 107 mmol/L    CO2 25.0 22.0 - 29.0 mmol/L    Calcium 8.6 8.6 - 10.5 mg/dL    Total Protein 6.9 6.0 - 8.5 g/dL    Albumin 3.70 3.50 - 5.20 g/dL    ALT (SGPT) 52 (H) 1 - 41 U/L    AST (SGOT) 37 1 - 40 U/L    Alkaline Phosphatase 75 39 - 117 U/L    Total Bilirubin 0.4 0.0 - 1.2 mg/dL    eGFR Non African Amer 117 >60 mL/min/1.73    Globulin 3.2 gm/dL    A/G Ratio 1.2 g/dL    BUN/Creatinine Ratio 23.7 7.0 - 25.0    Anion Gap 8.0 5.0 - 15.0 mmol/L   Urinalysis With Microscopic If Indicated (No Culture) - Urine, Catheter    Specimen: Urine, Catheter   Result Value Ref Range    Color, UA Yellow Yellow, Straw    Appearance, UA Cloudy (A) Clear    pH, UA 6.0 5.0 - 8.0    Specific Gravity, UA 1.009 1.005 - 1.030    Glucose, UA Negative Negative    Ketones, UA Negative Negative    Bilirubin, UA Negative Negative    Blood, UA Large (3+) (A) Negative    Protein,  mg/dL (2+) (A) Negative    Leuk Esterase, UA Large (3+)  (A) Negative    Nitrite, UA Positive (A) Negative    Urobilinogen, UA 0.2 E.U./dL 0.2 - 1.0 E.U./dL   CBC Auto Differential    Specimen: Blood   Result Value Ref Range    WBC 20.91 (H) 3.40 - 10.80 10*3/mm3    RBC 4.33 4.14 - 5.80 10*6/mm3    Hemoglobin 13.5 13.0 - 17.7 g/dL    Hematocrit 38.1 37.5 - 51.0 %    MCV 88.0 79.0 - 97.0 fL    MCH 31.2 26.6 - 33.0 pg    MCHC 35.4 31.5 - 35.7 g/dL    RDW 12.2 (L) 12.3 - 15.4 %    RDW-SD 38.7 37.0 - 54.0 fl    MPV 10.9 6.0 - 12.0 fL    Platelets 145 140 - 450 10*3/mm3    Neutrophil % 91.6 (H) 42.7 - 76.0 %    Lymphocyte % 2.1 (L) 19.6 - 45.3 %    Monocyte % 5.2 5.0 - 12.0 %    Eosinophil % 0.0 (L) 0.3 - 6.2 %    Basophil % 0.4 0.0 - 1.5 %    Immature Grans % 0.7 (H) 0.0 - 0.5 %    Neutrophils, Absolute 19.17 (H) 1.70 - 7.00 10*3/mm3    Lymphocytes, Absolute 0.43 (L) 0.70 - 3.10 10*3/mm3    Monocytes, Absolute 1.09 (H) 0.10 - 0.90 10*3/mm3    Eosinophils, Absolute 0.00 0.00 - 0.40 10*3/mm3    Basophils, Absolute 0.08 0.00 - 0.20 10*3/mm3    Immature Grans, Absolute 0.14 (H) 0.00 - 0.05 10*3/mm3    nRBC 0.0 0.0 - 0.2 /100 WBC   Urinalysis, Microscopic Only - Urine, Catheter    Specimen: Urine, Catheter   Result Value Ref Range    RBC, UA Too Numerous to Count (A) None Seen, 0-2 /HPF    WBC, UA Too Numerous to Count (A) None Seen, 0-2 /HPF    Bacteria, UA 4+ (A) None Seen /HPF    Squamous Epithelial Cells, UA 0-2 None Seen, 0-2 /HPF    Hyaline Casts, UA None Seen None Seen /LPF    Methodology Automated Microscopy    Lactic Acid, Plasma    Specimen: Blood   Result Value Ref Range    Lactate 1.3 0.5 - 2.0 mmol/L     Patient would like to leave after his IV antibiotics and fluids are complete.  I did have a long discussion with him about indications for admission including his SIRS criteria and concern for rapid decline if infection is not brought under control.  He expressed understanding but continues to desire discharge following ED treatment.  He expressed understanding of the  risk of medical decline including the risk of death from leaving against advice.  He accepts this risk.  He is invited to return at anytime with worsening or continued symptoms.  Patient agreeable with plan for oral antibiotics and antiemetic.    I have seen and personally evaluated this patient, discussed the case with the treating advanced practice provider, and reviewed their note. I was involved in the medical decision making during the evaluation, testing and disposition planning for this patient.     Louie Chiu MD  12/07/20 [id number]

## 2020-12-08 NOTE — DISCHARGE INSTRUCTIONS
Medications as ordered  Rest, increase water intake  Urine culture back in 48 hours, will call if your antibiotic needs to be changed  Follow up with pmd in 7-10 days to have urine rechecked  Return to er for fever, chills, vomiting, diarrhea, abdominal pain, decreased urine output, or any new or worsening symptoms

## 2020-12-08 NOTE — ED NOTES
Pt AAOx4, ABC's intact, NAD noted at this time. Pt discharged c belongings and educational packet. PPE worn by this RN c pt wearing mask during encounters.      Maikel Biswas, OSMANI  12/07/20 0848

## 2020-12-09 ENCOUNTER — TELEPHONE (OUTPATIENT)
Dept: INTERNAL MEDICINE | Facility: CLINIC | Age: 35
End: 2020-12-09

## 2020-12-09 NOTE — TELEPHONE ENCOUNTER
Can we send this order to [facility]? Usually a urologist would write for this. If he doesn't have one, can we enter referral for Dr. Valle?

## 2020-12-09 NOTE — TELEPHONE ENCOUNTER
Patient's mom says patient needs an order for a Catheter 14 Uzbek 16 inch vinyl 4 times a day. She would like to  today by noon if possible.    Please advise  [phone]

## 2020-12-09 NOTE — TELEPHONE ENCOUNTER
Pt's Mother advised she is just needing a 1 week supply until she gets the order from mail order supplier  She is going to call Dr Valle office and get him radha Henry signed and it was faxed to [facility] [phone]

## 2020-12-09 NOTE — TELEPHONE ENCOUNTER
PATIENT IS JUST INFORMING HE WAS IN THE ER 12-7-20  FOR A UTI AND HE IS FEELING BETTER TODAY.    CALL BACK: [phone]

## 2021-04-26 ENCOUNTER — IMMUNIZATION (OUTPATIENT)
Dept: VACCINE CLINIC | Facility: HOSPITAL | Age: 36
End: 2021-04-26

## 2021-04-26 PROCEDURE — 91300 HC SARSCOV02 VAC 30MCG/0.3ML IM: CPT | Performed by: INTERNAL MEDICINE

## 2021-04-26 PROCEDURE — 0001A: CPT | Performed by: INTERNAL MEDICINE

## 2021-05-17 ENCOUNTER — IMMUNIZATION (OUTPATIENT)
Dept: VACCINE CLINIC | Facility: HOSPITAL | Age: 36
End: 2021-05-17

## 2021-05-17 PROCEDURE — 0002A: CPT | Performed by: INTERNAL MEDICINE

## 2021-05-17 PROCEDURE — 91300 HC SARSCOV02 VAC 30MCG/0.3ML IM: CPT | Performed by: INTERNAL MEDICINE

## 2021-06-15 ENCOUNTER — TELEPHONE (OUTPATIENT)
Dept: INTERNAL MEDICINE | Facility: CLINIC | Age: 36
End: 2021-06-15

## 2021-06-15 NOTE — TELEPHONE ENCOUNTER
Caller: Alonzo Haque    Relationship to patient: Self    Best call back number:[phone] (H)  Patient is needing: PATIENT CALLED IN STATING HE IS NEEDING A WORK ORDER FOR HIS WHEELCHAIR TO BE REPAIRED SENT OVER TO [facility]. STATED THAT HIS WHEELCHAIR HAS 2 FLAT TIRES AND NEEDS TO BE FIXED. PLEASE CALL PATIENT AND ADVISE. THANK YOU.

## 2021-06-16 NOTE — TELEPHONE ENCOUNTER
Hub staff attempted to follow warm transfer process and was unsuccessful     Caller: Alonzo Haque    Relationship to patient: Self    Best call back number: [phone]    Patient is needing: PATIENT IS FOLLOWING-UP ON THE STATUS OF THIS PRESCRIPTION. PLEASE ADVISE.

## 2021-06-16 NOTE — TELEPHONE ENCOUNTER
Patient notified rx for wheelchair repair has been sent to [facility]. Patient notified this has been faxed but he is due for physical with Elaine. He will call back to schedule once his wheelchair is repaired so that he can get to appointment.

## 2021-06-16 NOTE — TELEPHONE ENCOUNTER
I will hand-write script and bring to you, thanks. Could we also get him scheduled for a physical with me? I last saw Oct 2020 and no future apt scheduled.

## 2021-06-28 ENCOUNTER — TELEPHONE (OUTPATIENT)
Dept: INTERNAL MEDICINE | Facility: CLINIC | Age: 36
End: 2021-06-28

## 2021-06-28 NOTE — TELEPHONE ENCOUNTER
Caller: Alonzo Haque    Relationship: Self    Best call back number: [phone]     What orders are you requesting (i.e. lab or imaging): CATHETERS    In what timeframe would the patient need to come in: ASAP    Where will you receive your lab/imaging services:   [facility]  [address]  [place] [address]    Additional notes:

## 2021-07-02 ENCOUNTER — TELEPHONE (OUTPATIENT)
Dept: INTERNAL MEDICINE | Facility: CLINIC | Age: 36
End: 2021-07-02

## 2021-07-02 NOTE — TELEPHONE ENCOUNTER
Caller: Alonzo Haque    Relationship: Self    Best call back number: [phone]  What orders are you requesting (i.e. lab or imaging): CATHETERS 14 Palestinian    In what timeframe would the patient need to come in: N/A    [facility]    Additional notes: PATIENT NEEDS MIHIR SANTOS TO CALL IN CATHETERS FOR THE PATIENT AT [facility] [address] [place] [address]

## 2021-10-04 ENCOUNTER — OFFICE VISIT (OUTPATIENT)
Dept: INTERNAL MEDICINE | Facility: CLINIC | Age: 36
End: 2021-10-04

## 2021-10-04 VITALS
SYSTOLIC BLOOD PRESSURE: 137 MMHG | WEIGHT: 185 LBS | DIASTOLIC BLOOD PRESSURE: 92 MMHG | TEMPERATURE: 98.6 F | HEART RATE: 93 BPM | OXYGEN SATURATION: 98 % | BODY MASS INDEX: 27.4 KG/M2 | HEIGHT: 69 IN | RESPIRATION RATE: 17 BRPM

## 2021-10-04 DIAGNOSIS — Q05.7 SPINA BIFIDA OF LUMBAR REGION WITHOUT HYDROCEPHALUS (HCC): ICD-10-CM

## 2021-10-04 DIAGNOSIS — N31.9 NEUROGENIC BLADDER: ICD-10-CM

## 2021-10-04 DIAGNOSIS — Z00.00 ANNUAL PHYSICAL EXAM: Primary | ICD-10-CM

## 2021-10-04 DIAGNOSIS — Z00.00 HEALTHCARE MAINTENANCE: ICD-10-CM

## 2021-10-04 DIAGNOSIS — R05.3 CHRONIC COUGH: ICD-10-CM

## 2021-10-04 DIAGNOSIS — K21.9 GASTROESOPHAGEAL REFLUX DISEASE WITHOUT ESOPHAGITIS: ICD-10-CM

## 2021-10-04 DIAGNOSIS — F17.200 CURRENT SMOKER: ICD-10-CM

## 2021-10-04 DIAGNOSIS — E78.2 MIXED HYPERLIPIDEMIA: ICD-10-CM

## 2021-10-04 PROCEDURE — 99214 OFFICE O/P EST MOD 30 MIN: CPT | Performed by: NURSE PRACTITIONER

## 2021-10-04 RX ORDER — OMEPRAZOLE 40 MG/1
CAPSULE, DELAYED RELEASE ORAL
Qty: 90 CAPSULE | Refills: 1 | Status: SHIPPED | OUTPATIENT
Start: 2021-10-04 | End: 2022-06-16

## 2021-10-04 NOTE — PROGRESS NOTES
"Chief Complaint  Hyperlipidemia (6 month follow up)    Subjective          Alonzo Haque presents to [facility]  Patient presents for follow-up on chronic conditions.  This is a 36-year-old male.    He has a history of spina bifida and concurrent neurogenic bladder.  He uses 14 Burkinan straight caths 2 times per day per the direction of his urologist.  He denies any urinary symptoms currently.    He had a history of hyperlipidemia, last labs checked on 10/26/2020 showed total cholesterol 209, LDL cholesterol 143.    He is a current smoker, 1/4 pack/day.  Currently denies readiness for cessation.    He endorses a cough postprandial.  He endorses acid reflux symptoms as well.  No fever or chills.  This has been a chronic issue.  Only present after eating.  He does not take acid reflux medication currently.    Denies development of any other new issues today.      Objective   Vital Signs:   /92 (BP Location: Right arm, Patient Position: Sitting, Cuff Size: Large Adult)   Pulse 93   Temp 98.6 °F (37 °C)   Resp 17   Ht 175.3 cm (69\")   Wt 83.9 kg (185 lb) Comment: Pt in wheelchair  SpO2 98%   BMI 27.32 kg/m²     Physical Exam  Vitals and nursing note reviewed.   Constitutional:       General: He is not in acute distress.     Appearance: Normal appearance. He is well-developed. He is not ill-appearing, toxic-appearing or diaphoretic.   HENT:      Head: Normocephalic and atraumatic.      Right Ear: Tympanic membrane, ear canal and external ear normal.      Left Ear: Tympanic membrane, ear canal and external ear normal.   Eyes:      Pupils: Pupils are equal, round, and reactive to light.   Neck:      Vascular: No carotid bruit.   Cardiovascular:      Rate and Rhythm: Normal rate and regular rhythm.      Pulses: Normal pulses.      Heart sounds: Normal heart sounds.      Comments: No peripheral edema  Pulmonary:      Effort: Pulmonary effort is normal. No respiratory distress.      " Breath sounds: Normal breath sounds. No stridor. No wheezing, rhonchi or rales.   Chest:      Chest wall: No tenderness.   Abdominal:      General: Bowel sounds are normal. There is no distension.      Palpations: Abdomen is soft. There is no mass.      Tenderness: There is no abdominal tenderness. There is no right CVA tenderness, left CVA tenderness, guarding or rebound.      Hernia: No hernia is present.   Musculoskeletal:         General: Normal range of motion.      Cervical back: Normal range of motion and neck supple. No rigidity or tenderness.   Lymphadenopathy:      Cervical: No cervical adenopathy.   Skin:     General: Skin is warm and dry.      Capillary Refill: Capillary refill takes less than 2 seconds.   Neurological:      General: No focal deficit present.      Mental Status: He is alert and oriented to person, place, and time. Mental status is at baseline.      Comments: Baseline, wheelchair bound.   Psychiatric:         Mood and Affect: Mood normal.         Behavior: Behavior normal.         Thought Content: Thought content normal.         Judgment: Judgment normal.        Result Review :   The following data was reviewed by: YUMIKO Yap on 10/04/2021:  Common labs    Common Labsle 10/26/20 10/26/20 10/26/20 12/7/20 12/7/20    1000 1000 1000 2142 2142   Glucose     108 (A)   Glucose  89      BUN  8   18   Creatinine  0.67 (A)   0.76   eGFR Non  Am  135   117   eGFR African Am  >150      Sodium  139   132 (A)   Potassium  4.4   3.6   Chloride  103   99   Calcium  9.0   8.6   Total Protein  6.7      Albumin  3.90   3.70   Total Bilirubin  0.2   0.4   Alkaline Phosphatase  71   75   AST (SGOT)  17   37   ALT (SGPT)  18   52 (A)   WBC 5.91   20.91 (A)    Hemoglobin 13.6   13.5    Hematocrit 41.2   38.1    Platelets 425   145    Total Cholesterol   209 (A)     Triglycerides   70     HDL Cholesterol   53     LDL Cholesterol    143 (A)     (A) Abnormal value            Current outpatient  and discharge medications have been reconciled for the patient.  Reviewed by: YUMIKO Yap           Assessment and Plan    Diagnoses and all orders for this visit:    1. Annual physical exam (Primary)  -     CBC No Differential  -     Comprehensive metabolic panel  -     Lipid panel  -     TSH Rfx On Abnormal To Free T4    2. Spina bifida of lumbar region without hydrocephalus (HCC)  Assessment & Plan:  Stable, he continues to use his wheelchair regularly.      3. Neurogenic bladder  Assessment & Plan:  Related to history of spina bifida.  He uses 14 French straight catheters twice daily.  He will see urology regularly as well.    Orders:  -     CBC No Differential  -     Comprehensive metabolic panel  -     TSH Rfx On Abnormal To Free T4    4. Healthcare maintenance    5. Mixed hyperlipidemia  Comments:  Encouraged lowering intake of fat in the diet.  Lipid panel today and we will adjust therapy if needed.  Orders:  -     CBC No Differential  -     Comprehensive metabolic panel  -     Lipid panel  -     TSH Rfx On Abnormal To Free T4    6. Gastroesophageal reflux disease without esophagitis  Assessment & Plan:  Likely causing his postprandial cough.  Discussed avoiding trigger foods and I will add omeprazole daily x2 weeks then daily as needed thereafter.    Orders:  -     omeprazole (priLOSEC) 40 MG capsule; Take one capsule daily x 2 weeks then daily as needed therafter  Dispense: 90 capsule; Refill: 1    7. Current smoker  Assessment & Plan:  Full cessation is encouraged although he denies readiness at this time.    Orders:  -     XR Chest PA & Lateral (In Office); Future    8. Chronic cough  Comments:  Based on symptoms, likely related to GERD.  Chest x-ray ordered and I encouraged full smoking cessation.  Orders:  -     XR Chest PA & Lateral (In Office); Future    We will contact patient with lab and imaging results and any further recommendations.  Follow-up as needed and I will see him back in 6  months for recheck of chronic conditions/Medicare wellness visit.    Follow Up   Return in about 6 months (around 4/4/2022) for Medicare Wellness.  Patient was given instructions and counseling regarding his condition or for health maintenance advice. Please see specific information pulled into the AVS if appropriate.

## 2021-10-04 NOTE — ASSESSMENT & PLAN NOTE
Likely causing his postprandial cough.  Discussed avoiding trigger foods and I will add omeprazole daily x2 weeks then daily as needed thereafter.

## 2021-10-04 NOTE — ASSESSMENT & PLAN NOTE
Related to history of spina bifida.  He uses 14 Nepalese straight catheters twice daily.  He will see urology regularly as well.

## 2021-10-05 LAB
ALBUMIN SERPL-MCNC: 4.6 G/DL (ref 3.5–5.2)
ALBUMIN/GLOB SERPL: 2 G/DL
ALP SERPL-CCNC: 72 U/L (ref 39–117)
ALT SERPL-CCNC: 18 U/L (ref 1–41)
AST SERPL-CCNC: 16 U/L (ref 1–40)
BILIRUB SERPL-MCNC: 0.3 MG/DL (ref 0–1.2)
BUN SERPL-MCNC: 9 MG/DL (ref 6–20)
BUN/CREAT SERPL: 15.3 (ref 7–25)
CALCIUM SERPL-MCNC: 9.5 MG/DL (ref 8.6–10.5)
CHLORIDE SERPL-SCNC: 103 MMOL/L (ref 98–107)
CHOLEST SERPL-MCNC: 244 MG/DL (ref 0–200)
CO2 SERPL-SCNC: 25.6 MMOL/L (ref 22–29)
CREAT SERPL-MCNC: 0.59 MG/DL (ref 0.76–1.27)
ERYTHROCYTE [DISTWIDTH] IN BLOOD BY AUTOMATED COUNT: 11.9 % (ref 12.3–15.4)
GLOBULIN SER CALC-MCNC: 2.3 GM/DL
GLUCOSE SERPL-MCNC: 90 MG/DL (ref 65–99)
HCT VFR BLD AUTO: 44.1 % (ref 37.5–51)
HDLC SERPL-MCNC: 36 MG/DL (ref 40–60)
HGB BLD-MCNC: 14.9 G/DL (ref 13–17.7)
LDLC SERPL CALC-MCNC: 189 MG/DL (ref 0–100)
MCH RBC QN AUTO: 29.9 PG (ref 26.6–33)
MCHC RBC AUTO-ENTMCNC: 33.8 G/DL (ref 31.5–35.7)
MCV RBC AUTO: 88.6 FL (ref 79–97)
PLATELET # BLD AUTO: 269 10*3/MM3 (ref 140–450)
POTASSIUM SERPL-SCNC: 4.3 MMOL/L (ref 3.5–5.2)
PROT SERPL-MCNC: 6.9 G/DL (ref 6–8.5)
RBC # BLD AUTO: 4.98 10*6/MM3 (ref 4.14–5.8)
SODIUM SERPL-SCNC: 139 MMOL/L (ref 136–145)
TRIGL SERPL-MCNC: 104 MG/DL (ref 0–150)
TSH SERPL DL<=0.005 MIU/L-ACNC: 1.27 UIU/ML (ref 0.27–4.2)
VLDLC SERPL CALC-MCNC: 19 MG/DL (ref 5–40)
WBC # BLD AUTO: 7.72 10*3/MM3 (ref 3.4–10.8)

## 2021-10-06 DIAGNOSIS — E78.2 MIXED HYPERLIPIDEMIA: Primary | ICD-10-CM

## 2021-10-06 RX ORDER — ATORVASTATIN CALCIUM 20 MG/1
20 TABLET, FILM COATED ORAL DAILY
Qty: 90 TABLET | Refills: 1 | Status: SHIPPED | OUTPATIENT
Start: 2021-10-06 | End: 2022-06-16

## 2021-10-06 NOTE — PROGRESS NOTES
Please call patient, blood count looks good, no anemia, normal white count and platelets.  Metabolic panel is stable including kidney function, electrolytes and liver enzymes.  LDL cholesterol is extremely elevated at 189.  At this level I would recommend taking atorvastatin 20 mg daily to help lower cholesterol thereby lowering risk for cardiovascular events in the future.  I sent this Rx.  Thyroid numbers are stable.

## 2021-11-08 ENCOUNTER — APPOINTMENT (OUTPATIENT)
Dept: WOMENS IMAGING | Facility: HOSPITAL | Age: 36
End: 2021-11-08

## 2021-11-08 PROCEDURE — 71045 X-RAY EXAM CHEST 1 VIEW: CPT | Performed by: RADIOLOGY

## 2022-01-05 ENCOUNTER — HOSPITAL ENCOUNTER (OUTPATIENT)
Dept: CARDIOLOGY | Facility: HOSPITAL | Age: 37
Discharge: HOME | End: 2022-01-05

## 2022-01-05 ENCOUNTER — OFFICE VISIT (OUTPATIENT)
Dept: INTERNAL MEDICINE | Facility: CLINIC | Age: 37
End: 2022-01-05

## 2022-01-05 ENCOUNTER — HOSPITAL ENCOUNTER (OUTPATIENT)
Dept: GENERAL RADIOLOGY | Facility: HOSPITAL | Age: 37
Discharge: HOME | End: 2022-01-05

## 2022-01-05 VITALS
SYSTOLIC BLOOD PRESSURE: 141 MMHG | HEART RATE: 82 BPM | OXYGEN SATURATION: 96 % | RESPIRATION RATE: 16 BRPM | DIASTOLIC BLOOD PRESSURE: 89 MMHG

## 2022-01-05 VITALS
OXYGEN SATURATION: 97 % | BODY MASS INDEX: 26.66 KG/M2 | DIASTOLIC BLOOD PRESSURE: 87 MMHG | RESPIRATION RATE: 17 BRPM | HEIGHT: 69 IN | SYSTOLIC BLOOD PRESSURE: 135 MMHG | WEIGHT: 180 LBS | HEART RATE: 100 BPM | TEMPERATURE: 98.9 F

## 2022-01-05 DIAGNOSIS — R07.2 PRECORDIAL PAIN: ICD-10-CM

## 2022-01-05 DIAGNOSIS — R07.89 CHEST DISCOMFORT: Primary | ICD-10-CM

## 2022-01-05 DIAGNOSIS — R06.02 SHORTNESS OF BREATH: ICD-10-CM

## 2022-01-05 LAB
ALBUMIN SERPL-MCNC: 4.6 G/DL (ref 3.5–5.2)
ALBUMIN/GLOB SERPL: 1.6 G/DL
ALP SERPL-CCNC: 79 U/L (ref 39–117)
ALT SERPL W P-5'-P-CCNC: 24 U/L (ref 1–41)
ANION GAP SERPL CALCULATED.3IONS-SCNC: 11.6 MMOL/L (ref 5–15)
AST SERPL-CCNC: 22 U/L (ref 1–40)
BASOPHILS # BLD AUTO: 0.04 10*3/MM3 (ref 0–0.2)
BASOPHILS NFR BLD AUTO: 0.4 % (ref 0–1.5)
BILIRUB SERPL-MCNC: <0.2 MG/DL (ref 0–1.2)
BUN SERPL-MCNC: 14 MG/DL (ref 6–20)
BUN/CREAT SERPL: 21.5 (ref 7–25)
CALCIUM SPEC-SCNC: 9.3 MG/DL (ref 8.6–10.5)
CHLORIDE SERPL-SCNC: 103 MMOL/L (ref 98–107)
CHOLEST SERPL-MCNC: 155 MG/DL (ref 0–200)
CO2 SERPL-SCNC: 25.4 MMOL/L (ref 22–29)
CREAT SERPL-MCNC: 0.65 MG/DL (ref 0.76–1.27)
D DIMER PPP FEU-MCNC: 0.38 MCGFEU/ML (ref 0–0.49)
DEPRECATED RDW RBC AUTO: 38.9 FL (ref 37–54)
EOSINOPHIL # BLD AUTO: 0.19 10*3/MM3 (ref 0–0.4)
EOSINOPHIL NFR BLD AUTO: 1.7 % (ref 0.3–6.2)
ERYTHROCYTE [DISTWIDTH] IN BLOOD BY AUTOMATED COUNT: 11.9 % (ref 12.3–15.4)
GFR SERPL CREATININE-BSD FRML MDRD: 139 ML/MIN/1.73
GLOBULIN UR ELPH-MCNC: 2.8 GM/DL
GLUCOSE SERPL-MCNC: 104 MG/DL (ref 65–99)
HCT VFR BLD AUTO: 44.5 % (ref 37.5–51)
HDLC SERPL-MCNC: 34 MG/DL (ref 40–60)
HGB BLD-MCNC: 15.3 G/DL (ref 13–17.7)
IMM GRANULOCYTES # BLD AUTO: 0.02 10*3/MM3 (ref 0–0.05)
IMM GRANULOCYTES NFR BLD AUTO: 0.2 % (ref 0–0.5)
LDLC SERPL CALC-MCNC: 106 MG/DL (ref 0–100)
LDLC/HDLC SERPL: 3.09 {RATIO}
LYMPHOCYTES # BLD AUTO: 1.66 10*3/MM3 (ref 0.7–3.1)
LYMPHOCYTES NFR BLD AUTO: 15.1 % (ref 19.6–45.3)
MCH RBC QN AUTO: 31 PG (ref 26.6–33)
MCHC RBC AUTO-ENTMCNC: 34.4 G/DL (ref 31.5–35.7)
MCV RBC AUTO: 90.1 FL (ref 79–97)
MONOCYTES # BLD AUTO: 0.77 10*3/MM3 (ref 0.1–0.9)
MONOCYTES NFR BLD AUTO: 7 % (ref 5–12)
NEUTROPHILS NFR BLD AUTO: 75.6 % (ref 42.7–76)
NEUTROPHILS NFR BLD AUTO: 8.31 10*3/MM3 (ref 1.7–7)
NRBC BLD AUTO-RTO: 0 /100 WBC (ref 0–0.2)
NT-PROBNP SERPL-MCNC: 5.5 PG/ML (ref 0–450)
PLATELET # BLD AUTO: 279 10*3/MM3 (ref 140–450)
PMV BLD AUTO: 10.5 FL (ref 6–12)
POTASSIUM SERPL-SCNC: 4.2 MMOL/L (ref 3.5–5.2)
PROT SERPL-MCNC: 7.4 G/DL (ref 6–8.5)
RBC # BLD AUTO: 4.94 10*6/MM3 (ref 4.14–5.8)
SODIUM SERPL-SCNC: 140 MMOL/L (ref 136–145)
TRIGL SERPL-MCNC: 79 MG/DL (ref 0–150)
TROPONIN T SERPL-MCNC: <0.01 NG/ML (ref 0–0.03)
VLDLC SERPL-MCNC: 15 MG/DL (ref 5–40)
WBC NRBC COR # BLD: 10.99 10*3/MM3 (ref 3.4–10.8)

## 2022-01-05 PROCEDURE — 93306 TTE W/DOPPLER COMPLETE: CPT

## 2022-01-05 PROCEDURE — 93306 TTE W/DOPPLER COMPLETE: CPT | Performed by: INTERNAL MEDICINE

## 2022-01-05 PROCEDURE — 84484 ASSAY OF TROPONIN QUANT: CPT | Performed by: INTERNAL MEDICINE

## 2022-01-05 PROCEDURE — 83880 ASSAY OF NATRIURETIC PEPTIDE: CPT | Performed by: INTERNAL MEDICINE

## 2022-01-05 PROCEDURE — 71046 X-RAY EXAM CHEST 2 VIEWS: CPT

## 2022-01-05 PROCEDURE — 99215 OFFICE O/P EST HI 40 MIN: CPT | Performed by: NURSE PRACTITIONER

## 2022-01-05 PROCEDURE — 93000 ELECTROCARDIOGRAM COMPLETE: CPT | Performed by: NURSE PRACTITIONER

## 2022-01-05 PROCEDURE — 99213 OFFICE O/P EST LOW 20 MIN: CPT | Performed by: NURSE PRACTITIONER

## 2022-01-05 PROCEDURE — 94760 N-INVAS EAR/PLS OXIMETRY 1: CPT

## 2022-01-05 PROCEDURE — 85379 FIBRIN DEGRADATION QUANT: CPT | Performed by: INTERNAL MEDICINE

## 2022-01-05 PROCEDURE — 85025 COMPLETE CBC W/AUTO DIFF WBC: CPT

## 2022-01-05 PROCEDURE — 36415 COLL VENOUS BLD VENIPUNCTURE: CPT

## 2022-01-05 PROCEDURE — 80053 COMPREHEN METABOLIC PANEL: CPT

## 2022-01-05 PROCEDURE — 80061 LIPID PANEL: CPT | Performed by: NURSE PRACTITIONER

## 2022-01-05 RX ORDER — NITROGLYCERIN 0.4 MG/1
0.4 TABLET SUBLINGUAL
Status: DISCONTINUED | OUTPATIENT
Start: 2022-01-05 | End: 2022-07-11

## 2022-01-05 RX ORDER — SODIUM CHLORIDE 0.9 % (FLUSH) 0.9 %
10 SYRINGE (ML) INJECTION AS NEEDED
Status: DISCONTINUED | OUTPATIENT
Start: 2022-01-05 | End: 2022-07-11

## 2022-01-05 NOTE — PROGRESS NOTES
CARDIOLOGY        Patient Name: Alonzo Haque  :1985  Age: 36 y.o.  Primary Cardiologist: Jovanny Petty MD  Encounter Provider:  BI MIRANDA    Date of Service: 22          CHIEF COMPLAINT / REASON FOR OFFICE VISIT     Chest Pain and Shortness of Breath      HISTORY OF PRESENT ILLNESS       HPI  Alonzo Haque is a 36 y.o. male who presents to the [facility] today for evaluation of chest discomfort, referred by PCP.     Pt has a  history significant for spina bifida, hyperlipidemia.    Patient referred by PCP for evaluation of chest discomfort x4-5 days.  Patient had ECG tracings in PCP office earlier today.  I have personally reviewed the tracings and my interpretation is normal sinus rhythm with nonspecific T wave changes.  No prior ECG to compare.    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  He states that this lasted for minutes and then resolved.  He reports some mild dyspnea as well as a cough but denies any nausea, vomiting, diaphoresis.  States that he has not had any further episodes that were that severe since that time.  Patient does note that he continues to have some chest heaviness rated 3/10 that has been more constant over the past few days.  Patient risk factors for coronary disease include hyperlipidemia, most recent  and placement was placed on statin therapy, and a 12-year history of smoking.  Denies hypertension, diabetes, family history of coronary disease.      The following portions of the patient's history were reviewed and updated as appropriate: allergies, current medications, past family history, past medical history, past social history, past surgical history and problem list.      VITAL SIGNS     Visit Vitals  /89 (BP Location: Left arm, Patient Position: Sitting) Comment: 136/86 right sitting   Pulse 82    Resp 16   SpO2 96%         Wt Readings from Last 3 Encounters:   01/05/22 81.6 kg (180 lb)   10/04/21 83.9 kg (185 lb)   10/26/20 83.9 kg (185 lb)     There is no height or weight on file to calculate BMI.      REVIEW OF SYSTEMS   Review of Systems   Constitutional: Negative for chills, fever, weight gain and weight loss.   Cardiovascular: Positive for chest pain. Negative for leg swelling.   Respiratory: Positive for cough and shortness of breath. Negative for snoring and wheezing.    Hematologic/Lymphatic: Negative for bleeding problem. Does not bruise/bleed easily.   Skin: Negative for color change.   Musculoskeletal: Negative for falls, joint pain and myalgias.   Gastrointestinal: Negative for melena.   Genitourinary: Negative for hematuria.   Neurological: Negative for excessive daytime sleepiness.   Psychiatric/Behavioral: Negative for depression. The patient is not nervous/anxious.            PHYSICAL EXAMINATION     Constitutional:       Appearance: Normal appearance. Well-developed.   Eyes:      Conjunctiva/sclera: Conjunctivae normal.   Neck:      Vascular: No carotid bruit.   Pulmonary:      Effort: Pulmonary effort is normal.      Breath sounds: Decreased breath sounds present. Wheezing present.   Cardiovascular:      Normal rate. Regular rhythm. Normal S1. Normal S2.      Murmurs: There is no murmur.      No gallop. No click. No rub.   Musculoskeletal: Normal range of motion. Skin:     General: Skin is warm and dry.   Neurological:      Mental Status: Alert and oriented to person, place, and time.      GCS: GCS eye subscore is 4. GCS verbal subscore is 5. GCS motor subscore is 6.   Psychiatric:         Speech: Speech normal.         Behavior: Behavior normal.         Thought Content: Thought content normal.         Judgment: Judgment normal.           REVIEWED DATA     Procedures    Cardiac Procedures:  1. Echocardiogram 1/5/2022.  LVEF 66%.  Diastolic function normal.  Normal echocardiogram.    Lipid  Panel    Lipid Panel 10/4/21   Total Cholesterol 244 (A)   Triglycerides 104   HDL Cholesterol 36 (A)   VLDL Cholesterol 19   LDL Cholesterol  189 (A)   (A) Abnormal value       Comments are available for some flowsheets but are not being displayed.           BUN   Date Value Ref Range Status   01/05/2022 14 6 - 20 mg/dL Final     Creatinine   Date Value Ref Range Status   01/05/2022 0.65 (L) 0.76 - 1.27 mg/dL Final     Potassium   Date Value Ref Range Status   01/05/2022 4.2 3.5 - 5.2 mmol/L Final     Comment:     Slight hemolysis detected by analyzer. Results may be affected.     ALT (SGPT)   Date Value Ref Range Status   01/05/2022 24 1 - 41 U/L Final     AST (SGOT)   Date Value Ref Range Status   01/05/2022 22 1 - 40 U/L Final     Comment:     Slight hemolysis detected by analyzer. Results may be affected.           ASSESSMENT & PLAN      Diagnosis Plan   1. Precordial pain  Cardiac Monitoring    Vital Signs - Once    Vital Signs - As Needed    Pulse Oximetry    Oxygen Therapy- Nasal Cannula; Titrate for SPO2: 92%, equal to or greater than    Insert Peripheral IV    sodium chloride 0.9 % flush 10 mL    nitroglycerin (NITROSTAT) SL tablet 0.4 mg    NPO Diet    Bathroom Privileges With Assistance    CBC & Differential    Comprehensive Metabolic Panel    Troponin T    D-Dimer    proBNP    Cardiac Monitoring    Vital Signs - Once    Insert Peripheral IV    NPO Diet    Bathroom Privileges With Assistance    Troponin T    Troponin T    D-Dimer    D-Dimer    proBNP    proBNP   2. Shortness of breath  Cardiac Monitoring    Vital Signs - Once    Vital Signs - As Needed    Pulse Oximetry    Oxygen Therapy- Nasal Cannula; Titrate for SPO2: 92%, equal to or greater than    Insert Peripheral IV    sodium chloride 0.9 % flush 10 mL    nitroglycerin (NITROSTAT) SL tablet 0.4 mg    NPO Diet    Bathroom Privileges With Assistance    CBC & Differential    Comprehensive Metabolic Panel    Troponin T    D-Dimer    proBNP    Cardiac  Monitoring    Vital Signs - Once    Insert Peripheral IV    NPO Diet    Bathroom Privileges With Assistance    Troponin T    Troponin T    D-Dimer    D-Dimer    proBNP    proBNP         SUMMARY/DISCUSSION  1. Chest pain and dyspnea.  Patient sent from PCP for evaluation of chest discomfort with ECG revealing nonspecific T wave changes.  Patient had one episode of chest pain 3 to 4 days ago that lasted for minutes and then resolved.  Pain did radiate to the left arm and stopped at the elbow region.  He has not had any further episodes but does continue to have some chest heaviness with associated shortness of breath and a mild cough.  Patient had laboratory studies which were reviewed and documented above in clinic.  He had negative cardiac biomarkers x1.  He does have a slightly elevated WBC with wheezing and diminished lung sounds in the bases.  He had an echocardiogram in clinic which revealed preserved LVEF with normal LV wall motion.  No valvular abnormalities.  I will send patient for chest x-ray to rule out infectious etiology.  Patient will follow up with me in 1 week.  If he continues to have symptoms he will need a Lexiscan Myoview stress test.  Patient has history of spina bifida and does not ambulate.  2. Hyperlipidemia.  In October patient's LDL very elevated at 189 and he was placed on atorvastatin 20 mg/day.  Repeat fasting lipid panel in clinic reveals .    Patient was seen in the [facility] as an urgent add-on for evaluation of chest pain.  Decision level making was high as patient had laboratory studies as well as echocardiogram urgently in clinic.        MEDICATIONS         Discharge Medications      ASK your doctor about these medications      Instructions Start Date   atorvastatin 20 MG tablet  Commonly known as: LIPITOR   20 mg, Oral, Daily      omeprazole 40 MG capsule  Commonly known as: priLOSEC   Take one capsule daily x 2 weeks then daily as needed therafter                 **Isaiah  Disclaimer:   Much of this encounter note is an electronic transcription/translation of spoken language to printed text. The electronic translation of spoken language may permit erroneous, or at times, nonsensical words or phrases to be inadvertently transcribed. Although I have reviewed the note for such errors, some may still exist.

## 2022-01-05 NOTE — PROGRESS NOTES
"Chief Complaint  Chest Pain (Pt presents here today with concerns of chest discomfort. x 4-5 days)    Subjective          Alonzo Haque presents to [facility]  Patient presents with complaints of chest and left arm discomfort.  This is a 36-year-old male.  He has hyperlipidemia, GERD, history of spina bifida.  Symptoms began 3 days ago.  He endorses some mild concurrent shortness of breath.  The discomfort is present both with exertion and at rest.  No fever or chills.  Denies development of any other new issues today.      Objective   Vital Signs:   /87 (BP Location: Left arm, Patient Position: Sitting, Cuff Size: Adult)   Pulse 100   Temp 98.9 °F (37.2 °C)   Resp 17   Ht 175.3 cm (69\")   Wt 81.6 kg (180 lb) Comment: Pt stated  SpO2 97%   BMI 26.58 kg/m²     Physical Exam  Vitals and nursing note reviewed.   Constitutional:       General: He is not in acute distress.     Appearance: Normal appearance. He is well-developed. He is not ill-appearing, toxic-appearing or diaphoretic.   HENT:      Head: Normocephalic and atraumatic.      Right Ear: External ear normal.      Left Ear: External ear normal.   Eyes:      Pupils: Pupils are equal, round, and reactive to light.   Neck:      Vascular: No carotid bruit.   Cardiovascular:      Rate and Rhythm: Normal rate and regular rhythm.      Pulses: Normal pulses.      Heart sounds: Normal heart sounds.      Comments: No peripheral edema  Pulmonary:      Effort: Pulmonary effort is normal. No respiratory distress.      Breath sounds: Normal breath sounds. No stridor. No wheezing, rhonchi or rales.   Chest:      Chest wall: No tenderness.   Abdominal:      General: Bowel sounds are normal. There is no distension.      Palpations: Abdomen is soft.      Tenderness: There is no abdominal tenderness.   Musculoskeletal:         General: Normal range of motion.      Cervical back: Normal range of motion and neck supple. No rigidity or " tenderness.   Lymphadenopathy:      Cervical: No cervical adenopathy.   Skin:     General: Skin is warm and dry.      Capillary Refill: Capillary refill takes less than 2 seconds.   Neurological:      General: No focal deficit present.      Mental Status: He is alert and oriented to person, place, and time. Mental status is at baseline.   Psychiatric:         Mood and Affect: Mood normal.         Behavior: Behavior normal.         Thought Content: Thought content normal.         Judgment: Judgment normal.        Result Review :   The following data was reviewed by: YUMIKO Yap on 01/05/2022:  Common labs    Common Labsle 10/4/21 10/4/21 10/4/21    1112 1112 1112   Glucose  90    BUN  9    Creatinine  0.59 (A)    eGFR Non  Am  >150    eGFR African Am  >150    Sodium  139    Potassium  4.3    Chloride  103    Calcium  9.5    Total Protein  6.9    Albumin  4.60    Total Bilirubin  0.3    Alkaline Phosphatase  72    AST (SGOT)  16    ALT (SGPT)  18    WBC 7.72     Hemoglobin 14.9     Hematocrit 44.1     Platelets 269     Total Cholesterol   244 (A)   Triglycerides   104   HDL Cholesterol   36 (A)   LDL Cholesterol    189 (A)   (A) Abnormal value       Comments are available for some flowsheets but are not being displayed.           Current outpatient and discharge medications have been reconciled for the patient.  Reviewed by: YUMIKO Yap      ECG 12 Lead    Date/Time: 1/5/2022 9:07 AM  Performed by: Elaine Boyd APRN  Authorized by: Elaine Boyd APRN   Comparison: not compared with previous ECG   Previous ECG: no previous ECG available  Rhythm: sinus rhythm  Rate: normal  ST Flattening: aVF    Clinical impression: abnormal EKG              Assessment and Plan    Diagnoses and all orders for this visit:    1. Chest discomfort (Primary)  -     ECG 12 Lead      Due to the patient's symptom onset and progression, EKG I believe we need to rule out a cardiac etiology of this chest discomfort.  He is being transported via wheelchair by ALBERT Rosas to the chest pain clinic for further evaluation.    Follow-up as needed and at next scheduled office visit.    Follow Up   No follow-ups on file.  Patient was given instructions and counseling regarding his condition or for health maintenance advice. Please see specific information pulled into the AVS if appropriate.

## 2022-01-06 LAB
AORTIC ARCH: 1.7 CM
ASCENDING AORTA: 3.2 CM
BH CV ECHO MEAS - ACS: 2.1 CM
BH CV ECHO MEAS - AO MAX PG (FULL): 1.4 MMHG
BH CV ECHO MEAS - AO MAX PG: 3.9 MMHG
BH CV ECHO MEAS - AO MEAN PG (FULL): 0.7 MMHG
BH CV ECHO MEAS - AO MEAN PG: 2.1 MMHG
BH CV ECHO MEAS - AO ROOT AREA (BSA CORRECTED): 1.7
BH CV ECHO MEAS - AO ROOT AREA: 8.9 CM^2
BH CV ECHO MEAS - AO ROOT DIAM: 3.4 CM
BH CV ECHO MEAS - AO V2 MAX: 98.3 CM/SEC
BH CV ECHO MEAS - AO V2 MEAN: 70.3 CM/SEC
BH CV ECHO MEAS - AO V2 VTI: 17.8 CM
BH CV ECHO MEAS - ASC AORTA: 3.2 CM
BH CV ECHO MEAS - AVA(I,A): 3 CM^2
BH CV ECHO MEAS - AVA(I,D): 3 CM^2
BH CV ECHO MEAS - AVA(V,A): 2.5 CM^2
BH CV ECHO MEAS - AVA(V,D): 2.5 CM^2
BH CV ECHO MEAS - BSA(HAYCOCK): 2 M^2
BH CV ECHO MEAS - BSA: 2 M^2
BH CV ECHO MEAS - BZI_BMI: 26.6 KILOGRAMS/M^2
BH CV ECHO MEAS - BZI_METRIC_HEIGHT: 175.3 CM
BH CV ECHO MEAS - BZI_METRIC_WEIGHT: 81.6 KG
BH CV ECHO MEAS - EDV(MOD-SP2): 119 ML
BH CV ECHO MEAS - EDV(MOD-SP4): 112 ML
BH CV ECHO MEAS - EDV(TEICH): 70.1 ML
BH CV ECHO MEAS - EF(CUBED): 65.2 %
BH CV ECHO MEAS - EF(MOD-BP): 66.5 %
BH CV ECHO MEAS - EF(MOD-SP2): 67.2 %
BH CV ECHO MEAS - EF(MOD-SP4): 66.1 %
BH CV ECHO MEAS - EF(TEICH): 57.3 %
BH CV ECHO MEAS - ESV(MOD-SP2): 39 ML
BH CV ECHO MEAS - ESV(MOD-SP4): 38 ML
BH CV ECHO MEAS - ESV(TEICH): 29.9 ML
BH CV ECHO MEAS - FS: 29.7 %
BH CV ECHO MEAS - IVS/LVPW: 1
BH CV ECHO MEAS - IVSD: 0.93 CM
BH CV ECHO MEAS - LAT PEAK E' VEL: 13.1 CM/SEC
BH CV ECHO MEAS - LV DIASTOLIC VOL/BSA (35-75): 56.7 ML/M^2
BH CV ECHO MEAS - LV MASS(C)D: 113.3 GRAMS
BH CV ECHO MEAS - LV MASS(C)DI: 57.4 GRAMS/M^2
BH CV ECHO MEAS - LV MAX PG: 2.4 MMHG
BH CV ECHO MEAS - LV MEAN PG: 1.4 MMHG
BH CV ECHO MEAS - LV SYSTOLIC VOL/BSA (12-30): 19.2 ML/M^2
BH CV ECHO MEAS - LV V1 MAX: 78 CM/SEC
BH CV ECHO MEAS - LV V1 MEAN: 58.4 CM/SEC
BH CV ECHO MEAS - LV V1 VTI: 17 CM
BH CV ECHO MEAS - LVIDD: 4 CM
BH CV ECHO MEAS - LVIDS: 2.8 CM
BH CV ECHO MEAS - LVLD AP2: 8.8 CM
BH CV ECHO MEAS - LVLD AP4: 9 CM
BH CV ECHO MEAS - LVLS AP2: 6.7 CM
BH CV ECHO MEAS - LVLS AP4: 6.8 CM
BH CV ECHO MEAS - LVOT AREA (M): 3.1 CM^2
BH CV ECHO MEAS - LVOT AREA: 3.1 CM^2
BH CV ECHO MEAS - LVOT DIAM: 2 CM
BH CV ECHO MEAS - LVPWD: 0.91 CM
BH CV ECHO MEAS - MED PEAK E' VEL: 10.3 CM/SEC
BH CV ECHO MEAS - MV A DUR: 0.1 SEC
BH CV ECHO MEAS - MV A MAX VEL: 77.6 CM/SEC
BH CV ECHO MEAS - MV DEC SLOPE: 287.8 CM/SEC^2
BH CV ECHO MEAS - MV DEC TIME: 0.25 SEC
BH CV ECHO MEAS - MV E MAX VEL: 59.6 CM/SEC
BH CV ECHO MEAS - MV E/A: 0.77
BH CV ECHO MEAS - MV MAX PG: 2.1 MMHG
BH CV ECHO MEAS - MV MEAN PG: 0.98 MMHG
BH CV ECHO MEAS - MV P1/2T MAX VEL: 62.7 CM/SEC
BH CV ECHO MEAS - MV P1/2T: 63.8 MSEC
BH CV ECHO MEAS - MV V2 MAX: 71.9 CM/SEC
BH CV ECHO MEAS - MV V2 MEAN: 48 CM/SEC
BH CV ECHO MEAS - MV V2 VTI: 24.5 CM
BH CV ECHO MEAS - MVA P1/2T LCG: 3.5 CM^2
BH CV ECHO MEAS - MVA(P1/2T): 3.4 CM^2
BH CV ECHO MEAS - MVA(VTI): 2.2 CM^2
BH CV ECHO MEAS - PA ACC TIME: 0.13 SEC
BH CV ECHO MEAS - PA MAX PG (FULL): 1.7 MMHG
BH CV ECHO MEAS - PA MAX PG: 3 MMHG
BH CV ECHO MEAS - PA PR(ACCEL): 22 MMHG
BH CV ECHO MEAS - PA V2 MAX: 86 CM/SEC
BH CV ECHO MEAS - PVA(V,A): 2 CM^2
BH CV ECHO MEAS - PVA(V,D): 2 CM^2
BH CV ECHO MEAS - QP/QS: 0.7
BH CV ECHO MEAS - RV MAX PG: 1.2 MMHG
BH CV ECHO MEAS - RV MEAN PG: 0.79 MMHG
BH CV ECHO MEAS - RV V1 MAX: 55.7 CM/SEC
BH CV ECHO MEAS - RV V1 MEAN: 42.9 CM/SEC
BH CV ECHO MEAS - RV V1 VTI: 11.9 CM
BH CV ECHO MEAS - RVOT AREA: 3.1 CM^2
BH CV ECHO MEAS - RVOT DIAM: 2 CM
BH CV ECHO MEAS - SI(AO): 80.5 ML/M^2
BH CV ECHO MEAS - SI(CUBED): 21.2 ML/M^2
BH CV ECHO MEAS - SI(LVOT): 26.7 ML/M^2
BH CV ECHO MEAS - SI(MOD-SP2): 40.5 ML/M^2
BH CV ECHO MEAS - SI(MOD-SP4): 37.5 ML/M^2
BH CV ECHO MEAS - SI(TEICH): 20.3 ML/M^2
BH CV ECHO MEAS - SUP REN AO DIAM: 2.1 CM
BH CV ECHO MEAS - SV(AO): 159 ML
BH CV ECHO MEAS - SV(CUBED): 41.8 ML
BH CV ECHO MEAS - SV(LVOT): 52.7 ML
BH CV ECHO MEAS - SV(MOD-SP2): 80 ML
BH CV ECHO MEAS - SV(MOD-SP4): 74 ML
BH CV ECHO MEAS - SV(RVOT): 37.1 ML
BH CV ECHO MEAS - SV(TEICH): 40.2 ML
BH CV ECHO MEAS - TAPSE (>1.6): 2.2 CM
BH CV ECHO MEASUREMENTS AVERAGE E/E' RATIO: 5.09
BH CV XLRA - RV BASE: 3.2 CM
BH CV XLRA - RV LENGTH: 8 CM
BH CV XLRA - RV MID: 2.8 CM
BH CV XLRA - TDI S': 10.8 CM/SEC
LEFT ATRIUM VOLUME INDEX: 15 ML/M2
MAXIMAL PREDICTED HEART RATE: 184 BPM
SINUS: 3 CM
STJ: 2.9 CM
STRESS TARGET HR: 156 BPM

## 2022-01-10 ENCOUNTER — OFFICE VISIT (OUTPATIENT)
Dept: CARDIOLOGY | Facility: CLINIC | Age: 37
End: 2022-01-10

## 2022-01-10 VITALS — HEART RATE: 83 BPM | OXYGEN SATURATION: 98 % | SYSTOLIC BLOOD PRESSURE: 138 MMHG | DIASTOLIC BLOOD PRESSURE: 90 MMHG

## 2022-01-10 DIAGNOSIS — E78.2 MIXED HYPERLIPIDEMIA: ICD-10-CM

## 2022-01-10 DIAGNOSIS — R06.02 SHORTNESS OF BREATH: ICD-10-CM

## 2022-01-10 DIAGNOSIS — R07.2 PRECORDIAL PAIN: Primary | ICD-10-CM

## 2022-01-10 PROCEDURE — 99214 OFFICE O/P EST MOD 30 MIN: CPT | Performed by: NURSE PRACTITIONER

## 2022-01-10 NOTE — PROGRESS NOTES
CARDIOLOGY        Patient Name: Alonzo Haque  :1985  Age: 36 y.o.  Primary Cardiologist: Jovanny Petty MD  Encounter Provider:  YUMIKO Rdz    Date of Service: 22          CHIEF COMPLAINT / REASON FOR OFFICE VISIT     Chest Pain and Follow-up (CEC)      HISTORY OF PRESENT ILLNESS       HPI  Alonzo Haque is a 36 y.o. male who presents today for follow-up from [facility].    Pt has a  history significant for spina bifida, hyperlipidemia.    Patient referred by PCP for evaluation of chest discomfort x4-5 days.  Patient had ECG tracings in PCP office earlier today.  I have personally reviewed the tracings and my interpretation is normal sinus rhythm with nonspecific T wave changes.  No prior ECG to compare.    Patient presented to the [facility] approximately 1 to 2 weeks ago where he was complaining of an episode of chest discomfort that occurred 3 to 4 days prior to appointment.  He had echocardiogram which revealed preserved LVEF with normal LV wall motion.  Repeat lipid panel revealed that patient's cholesterol was much more controlled now that he is on atorvastatin.    Patient states that since evaluation he said he has not had any further episodes.  Reports that breathing is much improved.  States that he feels back to his baseline.  He specifically denies chest pain, dyspnea, palpitations, lightheadedness, fatigue.      The following portions of the patient's history were reviewed and updated as appropriate: allergies, current medications, past family history, past medical history, past social history, past surgical history and problem list.      VITAL SIGNS     Visit Vitals  /90 (BP Location: Left arm)   Pulse 83   SpO2 98%         Wt Readings from Last 3 Encounters:   22 81.6 kg (180 lb)   10/04/21 83.9 kg (185 lb)   10/26/20 83.9 kg (185 lb)     There is no height or weight on file to calculate BMI.      REVIEW OF SYSTEMS   Review of Systems   Constitutional: Negative for chills, fever,  weight gain and weight loss.   Cardiovascular: Negative for leg swelling.   Respiratory: Negative for cough, snoring and wheezing.    Hematologic/Lymphatic: Negative for bleeding problem. Does not bruise/bleed easily.   Skin: Negative for color change.   Musculoskeletal: Negative for falls, joint pain and myalgias.   Gastrointestinal: Negative for melena.   Genitourinary: Negative for hematuria.   Neurological: Negative for excessive daytime sleepiness.   Psychiatric/Behavioral: Negative for depression. The patient is not nervous/anxious.            PHYSICAL EXAMINATION     Constitutional:       Appearance: Normal appearance. Well-developed.   Eyes:      Conjunctiva/sclera: Conjunctivae normal.   Neck:      Vascular: No carotid bruit.   Pulmonary:      Effort: Pulmonary effort is normal.      Breath sounds: No decreased breath sounds. No wheezing.   Cardiovascular:      Normal rate. Regular rhythm. Normal S1. Normal S2.      Murmurs: There is no murmur.      No gallop. No click. No rub.   Musculoskeletal: Normal range of motion. Skin:     General: Skin is warm and dry.   Neurological:      Mental Status: Alert and oriented to person, place, and time.      GCS: GCS eye subscore is 4. GCS verbal subscore is 5. GCS motor subscore is 6.   Psychiatric:         Speech: Speech normal.         Behavior: Behavior normal.         Thought Content: Thought content normal.         Judgment: Judgment normal.           REVIEWED DATA     Procedures    Cardiac Procedures:  1. Echocardiogram 1/5/2022.  LVEF 66%.  Diastolic function normal.  Normal echocardiogram.    Lipid Panel    Lipid Panel 10/4/21 1/5/22   Total Cholesterol  155   Total Cholesterol 244 (A)    Triglycerides 104 79   HDL Cholesterol 36 (A) 34 (A)   VLDL Cholesterol 19 15   LDL Cholesterol  189 (A) 106 (A)   LDL/HDL Ratio  3.09   (A) Abnormal value       Comments are available for some flowsheets but are not being displayed.           No results found for: BUN,  CREATININE, K, ALT, AST        ASSESSMENT & PLAN      Diagnosis Plan   1. Precordial pain     2. Shortness of breath     3. Mixed hyperlipidemia           SUMMARY/DISCUSSION  1. Chest pain and dyspnea.  Completely resolved.  No further episodes.  2. Hyperlipidemia.  In October patient's LDL very elevated at 189 and he was placed on atorvastatin 20 mg/day.  Repeat fasting lipid panel in clinic reveals .  3. Follow-up with our clinic as needed in the future.        MEDICATIONS         Discharge Medications          Accurate as of January 10, 2022  9:38 AM. If you have any questions, ask your nurse or doctor.            Continue These Medications      Instructions Start Date   atorvastatin 20 MG tablet  Commonly known as: LIPITOR   20 mg, Oral, Daily      omeprazole 40 MG capsule  Commonly known as: priLOSEC   Take one capsule daily x 2 weeks then daily as needed therafter                 **Dragon Disclaimer:   Much of this encounter note is an electronic transcription/translation of spoken language to printed text. The electronic translation of spoken language may permit erroneous, or at times, nonsensical words or phrases to be inadvertently transcribed. Although I have reviewed the note for such errors, some may still exist.

## 2022-06-14 DIAGNOSIS — K21.9 GASTROESOPHAGEAL REFLUX DISEASE WITHOUT ESOPHAGITIS: ICD-10-CM

## 2022-06-14 DIAGNOSIS — E78.2 MIXED HYPERLIPIDEMIA: ICD-10-CM

## 2022-06-16 RX ORDER — OMEPRAZOLE 40 MG/1
CAPSULE, DELAYED RELEASE ORAL
Qty: 90 CAPSULE | Refills: 1 | Status: SHIPPED | OUTPATIENT
Start: 2022-06-16 | End: 2023-03-03

## 2022-06-16 RX ORDER — ATORVASTATIN CALCIUM 20 MG/1
TABLET, FILM COATED ORAL
Qty: 90 TABLET | Refills: 1 | Status: SHIPPED | OUTPATIENT
Start: 2022-06-16 | End: 2023-03-03 | Stop reason: SDUPTHER

## 2022-07-11 ENCOUNTER — OFFICE VISIT (OUTPATIENT)
Dept: INTERNAL MEDICINE | Facility: CLINIC | Age: 37
End: 2022-07-11

## 2022-07-11 VITALS
WEIGHT: 180 LBS | DIASTOLIC BLOOD PRESSURE: 79 MMHG | BODY MASS INDEX: 26.66 KG/M2 | RESPIRATION RATE: 17 BRPM | HEIGHT: 69 IN | TEMPERATURE: 98.3 F | OXYGEN SATURATION: 96 % | SYSTOLIC BLOOD PRESSURE: 122 MMHG | HEART RATE: 88 BPM

## 2022-07-11 DIAGNOSIS — F34.1 DYSTHYMIA: ICD-10-CM

## 2022-07-11 DIAGNOSIS — E78.2 MIXED HYPERLIPIDEMIA: ICD-10-CM

## 2022-07-11 DIAGNOSIS — K21.9 GASTROESOPHAGEAL REFLUX DISEASE WITHOUT ESOPHAGITIS: Chronic | ICD-10-CM

## 2022-07-11 DIAGNOSIS — Z00.00 MEDICARE ANNUAL WELLNESS VISIT, SUBSEQUENT: Primary | ICD-10-CM

## 2022-07-11 DIAGNOSIS — N31.9 NEUROGENIC BLADDER: Chronic | ICD-10-CM

## 2022-07-11 DIAGNOSIS — F17.200 CURRENT SMOKER: Chronic | ICD-10-CM

## 2022-07-11 DIAGNOSIS — Z00.00 HEALTHCARE MAINTENANCE: ICD-10-CM

## 2022-07-11 PROCEDURE — 1170F FXNL STATUS ASSESSED: CPT | Performed by: NURSE PRACTITIONER

## 2022-07-11 PROCEDURE — 1159F MED LIST DOCD IN RCRD: CPT | Performed by: NURSE PRACTITIONER

## 2022-07-11 PROCEDURE — 99214 OFFICE O/P EST MOD 30 MIN: CPT | Performed by: NURSE PRACTITIONER

## 2022-07-11 PROCEDURE — 1126F AMNT PAIN NOTED NONE PRSNT: CPT | Performed by: NURSE PRACTITIONER

## 2022-07-11 PROCEDURE — G0439 PPPS, SUBSEQ VISIT: HCPCS | Performed by: NURSE PRACTITIONER

## 2022-07-11 RX ORDER — SERTRALINE HYDROCHLORIDE 25 MG/1
25 TABLET, FILM COATED ORAL DAILY
Qty: 90 TABLET | Refills: 1 | Status: SHIPPED | OUTPATIENT
Start: 2022-07-11

## 2022-07-11 NOTE — ASSESSMENT & PLAN NOTE
Regular dental, vision exams are advised.    Tdap is recommended.  He would like to wait until his next visit to receive this as he has to go to work later today at [employer] and does not want his arm to be sore.

## 2022-07-11 NOTE — ASSESSMENT & PLAN NOTE
He is planning on trying some nicotine lozenges through a program with his work.  I asked him to let me know if he would like a referral for smoking cessation counseling with Kathie Herrera and he will notify me if he would like this referral placed.  I did encourage full cessation.

## 2022-07-11 NOTE — PROGRESS NOTES
The ABCs of the Annual Wellness Visit  Subsequent Medicare Wellness Visit    Chief Complaint   Patient presents with   • Medicare Wellness-subsequent     Pt presents here today for a medicare wellness visit.      Subjective    History of Present Illness:  Alonzo Haque is a 37 y.o. male who presents for a Subsequent Medicare Wellness Visit, follow-up on chronic conditions and an acute complaint.    He has a history of depression and in the past has taken Zoloft 25 mg daily.  He requests reinitiation of this medication today stating that he has been feeling a bit down and depressed lately.  Denies SI or HI.  Reports that he had no side effects when taking the Zoloft in the past.    He has hyperlipidemia endorsing good compliance with atorvastatin 20 mg daily.    He is a current smoker and endorses about three-quarter pack per day of cigarette use.  He is planning on trying to use nicotine lozenges through a smoking cessation program that his work offers.    He has spina bifida and neurogenic bladder.  He self caths routinely which works well for him and denies any dysfunction.    He has GERD and takes omeprazole 40 mg daily as needed.  This along with avoidance of trigger foods controls the condition well.    Due for Tdap.    Otherwise reports that he is doing well and denies development of other new issues today.    The following portions of the patient's history were reviewed and   updated as appropriate: allergies, current medications, past family history, past medical history, past social history, past surgical history and problem list.    Compared to one year ago, the patient feels his physical   health is the same.    Compared to one year ago, the patient feels his mental   health is the same.    Recent Hospitalizations:  He was not admitted to the hospital during the last year.       Current Medical Providers:  Patient Care Team:  Elaine Boyd APRN as PCP - General (Nurse Practitioner)    Outpatient  "Medications Prior to Visit   Medication Sig Dispense Refill   • atorvastatin (LIPITOR) 20 MG tablet TAKE ONE TABLET BY MOUTH DAILY 90 tablet 1   • omeprazole (priLOSEC) 40 MG capsule TAKE ONE CAPSULE BY MOUTH DAILY FOR 2 WEEKS, THEN DAILY AS NEEDED THERAFTER. 90 capsule 1     Facility-Administered Medications Prior to Visit   Medication Dose Route Frequency Provider Last Rate Last Admin   • nitroglycerin (NITROSTAT) SL tablet 0.4 mg  0.4 mg Sublingual Q5 Min PRN Jovanny Petty MD       • sodium chloride 0.9 % flush 10 mL  10 mL Intravenous PRN Jovanny Petty MD           No opioid medication identified on active medication list. I have reviewed chart for other potential  high risk medication/s and harmful drug interactions in the elderly.          Aspirin is not on active medication list.  Aspirin use is not indicated based on review of current medical condition/s. Risk of harm outweighs potential benefits.  .    Patient Active Problem List   Diagnosis   • Spina bifida of lumbar region without hydrocephalus (HCC)   • Neurogenic bladder   • Irritable bowel syndrome with both constipation and diarrhea   • Current smoker   • Healthcare maintenance   • Gastroesophageal reflux disease without esophagitis   • Mixed hyperlipidemia     Advance Care Planning  Advance Directive is not on file.  ACP discussion was held with the patient during this visit. Patient does not have an advance directive, information provided.    Review of Systems   Psychiatric/Behavioral: Positive for dysphoric mood.   All other systems reviewed and are negative.       Objective    Vitals:    07/11/22 1127   BP: 122/79   BP Location: Left arm   Patient Position: Sitting   Cuff Size: Large Adult   Pulse: 88   Resp: 17   Temp: 98.3 °F (36.8 °C)   SpO2: 96%   Weight: 81.6 kg (180 lb)   Height: 175.3 cm (69\")   PainSc: 0-No pain     Estimated body mass index is 26.58 kg/m² as calculated from the following:    Height as of this encounter: 175.3 " "cm (69\").    Weight as of this encounter: 81.6 kg (180 lb).    BMI is >= 25 and <30. (Overweight) The following options were offered after discussion;: weight loss educational material (shared in after visit summary)      Does the patient have evidence of cognitive impairment? No    Physical Exam  Vitals and nursing note reviewed.   Constitutional:       General: He is not in acute distress.     Appearance: Normal appearance. He is well-developed. He is not ill-appearing, toxic-appearing or diaphoretic.   HENT:      Head: Normocephalic and atraumatic.      Right Ear: External ear normal.      Left Ear: External ear normal.   Eyes:      Pupils: Pupils are equal, round, and reactive to light.   Neck:      Vascular: No carotid bruit.   Cardiovascular:      Rate and Rhythm: Normal rate and regular rhythm.      Pulses: Normal pulses.      Heart sounds: Normal heart sounds.      Comments: No peripheral edema  Pulmonary:      Effort: Pulmonary effort is normal. No respiratory distress.      Breath sounds: Normal breath sounds. No stridor. No wheezing, rhonchi or rales.   Chest:      Chest wall: No tenderness.   Abdominal:      General: Bowel sounds are normal. There is no distension.      Palpations: Abdomen is soft.      Tenderness: There is no abdominal tenderness. There is no right CVA tenderness or left CVA tenderness.   Musculoskeletal:         General: Normal range of motion.      Cervical back: Normal range of motion and neck supple. No rigidity or tenderness.   Lymphadenopathy:      Cervical: No cervical adenopathy.   Skin:     General: Skin is warm and dry.      Capillary Refill: Capillary refill takes less than 2 seconds.   Neurological:      General: No focal deficit present.      Mental Status: He is alert and oriented to person, place, and time. Mental status is at baseline.   Psychiatric:         Mood and Affect: Mood normal.         Behavior: Behavior normal.         Thought Content: Thought content normal.  " "       Judgment: Judgment normal.         Current outpatient and discharge medications have been reconciled for the patient.  Reviewed by: YUMIKO Yap           HEALTH RISK ASSESSMENT    Smoking Status:  Social History     Tobacco Use   Smoking Status Current Every Day Smoker   • Packs/day: 0.50   • Types: Cigarettes   • Start date: 2001   Smokeless Tobacco Never Used     Alcohol Consumption:  Social History     Substance and Sexual Activity   Alcohol Use Yes    Comment: \"very rare\"; caffeine use- coffee      Fall Risk Screen:    ELIZABETH Fall Risk Assessment was completed, and patient is at LOW risk for falls.Assessment completed on:7/11/2022    Depression Screening:  PHQ-2/PHQ-9 Depression Screening 7/11/2022   Retired PHQ-9 Total Score -   Retired Total Score -   Little Interest or Pleasure in Doing Things 0-->not at all   Feeling Down, Depressed or Hopeless 1-->several days   PHQ-9: Brief Depression Severity Measure Score 1       Health Habits and Functional and Cognitive Screening:  Functional & Cognitive Status 7/11/2022   Do you have difficulty preparing food and eating? No   Do you have difficulty bathing yourself, getting dressed or grooming yourself? No   Do you have difficulty using the toilet? No   Do you have difficulty moving around from place to place? No   Do you have trouble with steps or getting out of a bed or a chair? No   Current Diet Well Balanced Diet   Dental Exam Not up to date   Eye Exam Not up to date   Exercise (times per week) 0 times per week   Current Exercises Include No Regular Exercise   Do you need help using the phone?  No   Are you deaf or do you have serious difficulty hearing?  No   Do you need help with transportation? No   Do you need help shopping? No   Do you need help preparing meals?  No   Do you need help with housework?  No   Do you need help with laundry? No   Do you need help taking your medications? No   Do you need help managing money? No   Do you ever drive " or ride in a car without wearing a seat belt? No   Have you felt unusual stress, anger or loneliness in the last month? Yes   Who do you live with? Alone   If you need help, do you have trouble finding someone available to you? No   Have you been bothered in the last four weeks by sexual problems? No   Do you have difficulty concentrating, remembering or making decisions? No       Age-appropriate Screening Schedule:  Refer to the list below for future screening recommendations based on patient's age, sex and/or medical conditions. Orders for these recommended tests are listed in the plan section. The patient has been provided with a written plan.    Health Maintenance   Topic Date Due   • TDAP/TD VACCINES (1 - Tdap) Never done   • INFLUENZA VACCINE  10/01/2022   • LIPID PANEL  01/05/2023              Assessment & Plan   CMS Preventative Services Quick Reference  Risk Factors Identified During Encounter  Cardiovascular Disease  Depression/Dysphoria  Inactivity/Sedentary  The above risks/problems have been discussed with the patient.  Follow up actions/plans if indicated are seen below in the Assessment/Plan Section.  Pertinent information has been shared with the patient in the After Visit Summary.    Diagnoses and all orders for this visit:    1. Medicare annual wellness visit, subsequent (Primary)    2. Current smoker  Assessment & Plan:  He is planning on trying some nicotine lozenges through a program with his work.  I asked him to let me know if he would like a referral for smoking cessation counseling with Kathie Herrera and he will notify me if he would like this referral placed.  I did encourage full cessation.      3. Mixed hyperlipidemia  Assessment & Plan:  Continue atorvastatin.  Lipid panel today and we will adjust therapy if needed.    Orders:  -     CBC No Differential  -     Comprehensive metabolic panel  -     Lipid panel  -     TSH Rfx On Abnormal To Free T4    4. Neurogenic bladder  Assessment &  Plan:  Stable, he self caths regularly.  Denies any new issues.      5. Gastroesophageal reflux disease without esophagitis  Assessment & Plan:  Stable with omeprazole 40 mg daily as needed and avoidance of trigger foods, continue current therapy.      6. Healthcare maintenance  Assessment & Plan:  Regular dental, vision exams are advised.    Tdap is recommended.  He would like to wait until his next visit to receive this as he has to go to work later today at [employer] and does not want his arm to be sore.      7. Dysthymia  Assessment & Plan:  Patient's depression is slightly worse than last check.  He would like to restart Zoloft 25 mg daily which he went off about 2-1/2 years ago because he did not feel that he needed it anymore.  This has worked well for him in the past with no side effects.  I will see him back in 6 weeks for recheck of depression/reinitiation of Zoloft.  Discussed mechanism of action of this medication and potential side effects.    Orders:  -     sertraline (Zoloft) 25 MG tablet; Take 1 tablet by mouth Daily.  Dispense: 90 tablet; Refill: 1      Follow Up:   Return in about 6 weeks (around 8/22/2022).     We will contact patient with lab results and any further recommendations.  Follow-up as needed and I will see him back in 6 weeks for recheck of depression/follow-up on initiation of Zoloft.    An After Visit Summary and PPPS were made available to the patient.

## 2022-07-11 NOTE — ASSESSMENT & PLAN NOTE
Patient's depression is slightly worse than last check.  He would like to restart Zoloft 25 mg daily which he went off about 2-1/2 years ago because he did not feel that he needed it anymore.  This has worked well for him in the past with no side effects.  I will see him back in 6 weeks for recheck of depression/reinitiation of Zoloft.  Discussed mechanism of action of this medication and potential side effects.

## 2022-07-11 NOTE — ASSESSMENT & PLAN NOTE
Stable with omeprazole 40 mg daily as needed and avoidance of trigger foods, continue current therapy.

## 2022-07-11 NOTE — PATIENT INSTRUCTIONS
Medicare Wellness  Personal Prevention Plan of Service     Date of Office Visit:    Encounter Provider:  YUMIKO Yap  Place of Service:  [facility]  Patient Name: Alonzo Haque  :  1985    As part of the Medicare Wellness portion of your visit today, we are providing you with this personalized preventive plan of services (PPPS). This plan is based upon recommendations of the [place] Preventive Services Task Force (USPSTF) and the Advisory Committee on Immunization Practices (ACIP).    This lists the preventive care services that should be considered, and provides dates of when you are due. Items listed as completed are up-to-date and do not require any further intervention.    Health Maintenance   Topic Date Due    Pneumococcal Vaccine 0-64 (1 - PCV) Never done    TDAP/TD VACCINES (1 - Tdap) Never done    COVID-19 Vaccine (3 - Booster for Pfizer series) 10/17/2021    INFLUENZA VACCINE  10/01/2022    LIPID PANEL  2023    ANNUAL WELLNESS VISIT  2023    HEPATITIS C SCREENING  Completed       No orders of the defined types were placed in this encounter.      Return in about 6 months (around 2023).

## 2022-07-12 LAB
ALBUMIN SERPL-MCNC: 4.2 G/DL (ref 4–5)
ALBUMIN/GLOB SERPL: 1.6 {RATIO} (ref 1.2–2.2)
ALP SERPL-CCNC: 76 IU/L (ref 44–121)
ALT SERPL-CCNC: 19 IU/L (ref 0–44)
AST SERPL-CCNC: 18 IU/L (ref 0–40)
BILIRUB SERPL-MCNC: 0.4 MG/DL (ref 0–1.2)
BUN SERPL-MCNC: 14 MG/DL (ref 6–20)
BUN/CREAT SERPL: 19 (ref 9–20)
CALCIUM SERPL-MCNC: 9.6 MG/DL (ref 8.7–10.2)
CHLORIDE SERPL-SCNC: 103 MMOL/L (ref 96–106)
CHOLEST SERPL-MCNC: 160 MG/DL (ref 100–199)
CO2 SERPL-SCNC: 25 MMOL/L (ref 20–29)
CREAT SERPL-MCNC: 0.75 MG/DL (ref 0.76–1.27)
EGFRCR SERPLBLD CKD-EPI 2021: 119 ML/MIN/1.73
ERYTHROCYTE [DISTWIDTH] IN BLOOD BY AUTOMATED COUNT: 11.8 % (ref 11.6–15.4)
GLOBULIN SER CALC-MCNC: 2.7 G/DL (ref 1.5–4.5)
GLUCOSE SERPL-MCNC: 95 MG/DL (ref 65–99)
HCT VFR BLD AUTO: 43.7 % (ref 37.5–51)
HDLC SERPL-MCNC: 35 MG/DL
HGB BLD-MCNC: 14.8 G/DL (ref 13–17.7)
LDLC SERPL CALC-MCNC: 107 MG/DL (ref 0–99)
MCH RBC QN AUTO: 31 PG (ref 26.6–33)
MCHC RBC AUTO-ENTMCNC: 33.9 G/DL (ref 31.5–35.7)
MCV RBC AUTO: 91 FL (ref 79–97)
PLATELET # BLD AUTO: 244 X10E3/UL (ref 150–450)
POTASSIUM SERPL-SCNC: 4.8 MMOL/L (ref 3.5–5.2)
PROT SERPL-MCNC: 6.9 G/DL (ref 6–8.5)
RBC # BLD AUTO: 4.78 X10E6/UL (ref 4.14–5.8)
SODIUM SERPL-SCNC: 140 MMOL/L (ref 134–144)
TRIGL SERPL-MCNC: 98 MG/DL (ref 0–149)
TSH SERPL DL<=0.005 MIU/L-ACNC: 1 UIU/ML (ref 0.45–4.5)
VLDLC SERPL CALC-MCNC: 18 MG/DL (ref 5–40)
WBC # BLD AUTO: 14.4 X10E3/UL (ref 3.4–10.8)

## 2022-07-18 DIAGNOSIS — D72.829 LEUKOCYTOSIS, UNSPECIFIED TYPE: Primary | ICD-10-CM

## 2022-07-18 NOTE — PROGRESS NOTES
Please give patient a call, he does not have MyChart.  His white blood cell count is elevated and I would like to have him come back for a lab appointment in about 2 weeks to recheck and make sure this normalizes along with checking a breakdown of his white blood cells, please schedule him for a nonfasting lab appointment in about 2 weeks.  Kidney function, liver enzymes and electrolytes are stable along with thyroid numbers.  Mild elevation in LDL cholesterol, please watch intake of fats in the diet.

## 2022-08-22 ENCOUNTER — OFFICE VISIT (OUTPATIENT)
Dept: INTERNAL MEDICINE | Facility: CLINIC | Age: 37
End: 2022-08-22

## 2022-08-22 VITALS
HEART RATE: 83 BPM | RESPIRATION RATE: 18 BRPM | TEMPERATURE: 97.5 F | OXYGEN SATURATION: 94 % | WEIGHT: 180 LBS | HEIGHT: 69 IN | SYSTOLIC BLOOD PRESSURE: 126 MMHG | DIASTOLIC BLOOD PRESSURE: 84 MMHG | BODY MASS INDEX: 26.66 KG/M2

## 2022-08-22 DIAGNOSIS — F34.1 DYSTHYMIA: Primary | Chronic | ICD-10-CM

## 2022-08-22 DIAGNOSIS — Z23 NEED FOR TDAP VACCINATION: ICD-10-CM

## 2022-08-22 DIAGNOSIS — D72.829 LEUKOCYTOSIS, UNSPECIFIED TYPE: ICD-10-CM

## 2022-08-22 PROCEDURE — 99213 OFFICE O/P EST LOW 20 MIN: CPT | Performed by: NURSE PRACTITIONER

## 2022-08-22 NOTE — PROGRESS NOTES
"Chief Complaint  Follow-up and Depression (6 wk f/u)    Subjective        Alonzo Haque presents to [facility]  Patient presents for a 6-week follow-up on depression.  This is a 37-year-old male who I saw last in the office on 7/11/2022.  At which time he wished to restart Zoloft 25 mg daily which had been successful in the past for treating depression.  I restarted this medication and he presents today for follow-up stating that he has seen improvement in his depression symptoms and is happy with the dosing at 25 mg daily.  He has had no side effects.    He is also here to recheck his white blood cell count which was elevated at his last visit.  He denies any known sources of infection at this point.    Overall reports that he is doing well and denies development of other new issues today.      Objective   Vital Signs:  /84 Comment: manual recheck by YUMIKO  Pulse 83   Temp 97.5 °F (36.4 °C)   Resp 18   Ht 175.3 cm (69\")   Wt 81.6 kg (180 lb)   SpO2 94%   BMI 26.58 kg/m²   Estimated body mass index is 26.58 kg/m² as calculated from the following:    Height as of this encounter: 175.3 cm (69\").    Weight as of this encounter: 81.6 kg (180 lb).          Physical Exam  Vitals and nursing note reviewed.   Constitutional:       General: He is not in acute distress.     Appearance: Normal appearance. He is well-developed. He is not ill-appearing, toxic-appearing or diaphoretic.   HENT:      Head: Normocephalic and atraumatic.      Right Ear: External ear normal.      Left Ear: External ear normal.   Eyes:      Pupils: Pupils are equal, round, and reactive to light.   Cardiovascular:      Rate and Rhythm: Normal rate and regular rhythm.      Pulses: Normal pulses.      Heart sounds: Normal heart sounds.   Pulmonary:      Effort: Pulmonary effort is normal.      Breath sounds: Normal breath sounds.   Abdominal:      General: Bowel sounds are normal. There is no distension.      " Palpations: Abdomen is soft.      Tenderness: There is no abdominal tenderness.   Musculoskeletal:         General: Normal range of motion.      Cervical back: Normal range of motion and neck supple.   Skin:     General: Skin is warm and dry.      Capillary Refill: Capillary refill takes less than 2 seconds.   Neurological:      General: No focal deficit present.      Mental Status: He is alert and oriented to person, place, and time. Mental status is at baseline.   Psychiatric:         Mood and Affect: Mood normal.         Behavior: Behavior normal.         Thought Content: Thought content normal.         Judgment: Judgment normal.        Result Review :  The following data was reviewed by: YUMIKO Yap on 08/22/2022:  Common labs    Common Labsle 10/4/21 10/4/21 10/4/21 1/5/22 1/5/22 1/5/22 7/11/22 7/11/22 7/11/22    1112 1112 1112 0934 0934 1040 1230 1230 1230   Glucose  90   104 (A)   95    BUN  9   14   14    Creatinine  0.59 (A)   0.65 (A)   0.75 (A)    eGFR Non African Am  >150   139       eGFR  Am  >150          Sodium  139   140   140    Potassium  4.3   4.2   4.8    Chloride  103   103   103    Calcium  9.5   9.3   9.6    Total Protein  6.9      6.9    Albumin  4.60   4.60   4.2    Total Bilirubin  0.3   <0.2   0.4    Alkaline Phosphatase  72   79   76    AST (SGOT)  16   22   18    ALT (SGPT)  18   24   19    WBC 7.72   10.99 (A)   14.4 (A)     Hemoglobin 14.9   15.3   14.8     Hematocrit 44.1   44.5   43.7     Platelets 269   279   244     Total Cholesterol      155      Total Cholesterol   244 (A)      160   Triglycerides   104   79   98   HDL Cholesterol   36 (A)   34 (A)   35 (A)   LDL Cholesterol    189 (A)   106 (A)   107 (A)   (A) Abnormal value       Comments are available for some flowsheets but are not being displayed.           Data reviewed: Office Visit with Elaine Boyd APRN (07/11/2022)     Current outpatient and discharge medications have been reconciled for the  patient.  Reviewed by: YUMIKO Yap           Assessment and Plan   Diagnoses and all orders for this visit:    1. Dysthymia (Primary)  Assessment & Plan:  Patient's depression is improved.  Continue Zoloft 25 mg daily, stable with no new side effects.      2. Leukocytosis, unspecified type  Comments:  Rechecking CBC with added differential, no identifiable sources of infection at this time.  Orders:  -     CBC & Differential    3. Need for Tdap vaccination  Comments:  Unfortunately we are out of the office today.  We will administer at his next visit.  Orders:  -     Cancel: Tdap Vaccine Greater Than or Equal To 8yo IM         We will contact patient with results and further recommendations.  Follow-up as needed and I will see him back in 6 months for recheck of chronic conditions.    Follow Up   Return in about 6 months (around 2/22/2023), or if symptoms worsen or fail to improve, for Next scheduled follow up.  Patient was given instructions and counseling regarding his condition or for health maintenance advice. Please see specific information pulled into the AVS if appropriate.

## 2022-08-23 LAB
BASOPHILS # BLD AUTO: 0.1 X10E3/UL (ref 0–0.2)
BASOPHILS NFR BLD AUTO: 1 %
EOSINOPHIL # BLD AUTO: 0.3 X10E3/UL (ref 0–0.4)
EOSINOPHIL NFR BLD AUTO: 4 %
ERYTHROCYTE [DISTWIDTH] IN BLOOD BY AUTOMATED COUNT: 12.5 % (ref 11.6–15.4)
HCT VFR BLD AUTO: 43.6 % (ref 37.5–51)
HGB BLD-MCNC: 14.6 G/DL (ref 13–17.7)
IMM GRANULOCYTES # BLD AUTO: 0 X10E3/UL (ref 0–0.1)
IMM GRANULOCYTES NFR BLD AUTO: 0 %
LYMPHOCYTES # BLD AUTO: 1.4 X10E3/UL (ref 0.7–3.1)
LYMPHOCYTES NFR BLD AUTO: 19 %
Lab: NORMAL
MCH RBC QN AUTO: 30.5 PG (ref 26.6–33)
MCHC RBC AUTO-ENTMCNC: 33.5 G/DL (ref 31.5–35.7)
MCV RBC AUTO: 91 FL (ref 79–97)
MONOCYTES # BLD AUTO: 0.7 X10E3/UL (ref 0.1–0.9)
MONOCYTES NFR BLD AUTO: 10 %
NEUTROPHILS # BLD AUTO: 4.9 X10E3/UL (ref 1.4–7)
NEUTROPHILS NFR BLD AUTO: 66 %
PLATELET # BLD AUTO: 289 X10E3/UL (ref 150–450)
RBC # BLD AUTO: 4.78 X10E6/UL (ref 4.14–5.8)
WBC # BLD AUTO: 7.4 X10E3/UL (ref 3.4–10.8)

## 2022-08-23 NOTE — PROGRESS NOTES
Please call and let pt know that his while blood cell count has normalized and we will recheck routinely.

## 2022-11-28 ENCOUNTER — TELEPHONE (OUTPATIENT)
Dept: INTERNAL MEDICINE | Facility: CLINIC | Age: 37
End: 2022-11-28

## 2022-11-28 NOTE — TELEPHONE ENCOUNTER
Provider: MIHIR BROOKE  Caller: PATIENT  Relationship to Patient: SELF  Pharmacy: [facility]   Phone Number: [phone]    Reason for Call: PATIENT IS ASKING TO HAVE A SCRIPT SENT OVER TO [facility] FOR CATHETERS

## 2022-11-28 NOTE — TELEPHONE ENCOUNTER
Please ask pt specifics regarding type of catheters/gauge and how frequently he self-caths so I can give them all the info they will ask for on the script, thanks!

## 2023-03-01 ENCOUNTER — TELEPHONE (OUTPATIENT)
Dept: INTERNAL MEDICINE | Facility: CLINIC | Age: 38
End: 2023-03-01

## 2023-03-01 NOTE — TELEPHONE ENCOUNTER
Caller: JOANNE RENDON    Relationship: Mother    Best call back number:[phone]    What is the medical concern/diagnosis:POSSIBLE VENTRICULOPERITONEAL SHUNT DEFECT WITH SYMPTOMS OF CONFUSION, EYE CHANGE( UNABLE TO DESCRIBE EXACTLY WHAT IS DIFFERENT), AND SLEEPING A LOT     What specialty or service is being requested: NEUROSURGERY     What is the provider, practice or medical service name: DR. KRISTEL CARY, [facility]    What is the office location: [facility]    What is the office phone number: [phone]    Any additional details: PATIENT IS NEEDING A REFERRAL AS SOON AS POSSIBLE. CURRENT SHUNT WAS PLACED AROUND 20 YEARS AGO. PLEASE CALL PATIENT TO DISCUSS AND ADVISE.

## 2023-03-01 NOTE — TELEPHONE ENCOUNTER
Caller: JOANNE RENDON    Relationship to patient: Mother    Best call back number: [phone]    Patient is needing: PATIENT'S MOTHER IS CALLING TO CHECK ON THE STATUS OF THIS MESSAGE. PATIENT'S MOTHER STATES THAT HE NEEDS THIS REFERRAL PLACED ASAP. PLEASE ADVISE PATIENT'S MOTHER.

## 2023-03-01 NOTE — TELEPHONE ENCOUNTER
If concerned about increased confusion and lethargy, patient should go to the ER.  Patient will need to be seen in order to have a referral placed for neurosurgery.  It is also unlikely that neurosurgery will be able to see the patient soon enough to assess changes in mental status and an ER visit would help to make sure that the patient is safe.

## 2023-03-03 ENCOUNTER — OFFICE VISIT (OUTPATIENT)
Dept: INTERNAL MEDICINE | Facility: CLINIC | Age: 38
End: 2023-03-03
Payer: MEDICARE

## 2023-03-03 VITALS
DIASTOLIC BLOOD PRESSURE: 90 MMHG | TEMPERATURE: 98.6 F | SYSTOLIC BLOOD PRESSURE: 154 MMHG | BODY MASS INDEX: 26.58 KG/M2 | HEART RATE: 80 BPM | OXYGEN SATURATION: 97 % | HEIGHT: 69 IN

## 2023-03-03 DIAGNOSIS — K21.9 GASTROESOPHAGEAL REFLUX DISEASE WITHOUT ESOPHAGITIS: ICD-10-CM

## 2023-03-03 DIAGNOSIS — Z00.00 ENCOUNTER FOR MEDICAL EXAMINATION TO ESTABLISH CARE: ICD-10-CM

## 2023-03-03 DIAGNOSIS — G44.229 CHRONIC TENSION-TYPE HEADACHE, NOT INTRACTABLE: Primary | ICD-10-CM

## 2023-03-03 DIAGNOSIS — E78.2 MIXED HYPERLIPIDEMIA: ICD-10-CM

## 2023-03-03 PROCEDURE — 99213 OFFICE O/P EST LOW 20 MIN: CPT

## 2023-03-03 PROCEDURE — 1160F RVW MEDS BY RX/DR IN RCRD: CPT

## 2023-03-03 PROCEDURE — 1159F MED LIST DOCD IN RCRD: CPT

## 2023-03-03 RX ORDER — ATORVASTATIN CALCIUM 20 MG/1
20 TABLET, FILM COATED ORAL DAILY
Qty: 90 TABLET | Refills: 1 | Status: SHIPPED | OUTPATIENT
Start: 2023-03-03

## 2023-03-03 RX ORDER — OMEPRAZOLE 40 MG/1
CAPSULE, DELAYED RELEASE ORAL
Qty: 90 CAPSULE | Refills: 1 | Status: SHIPPED | OUTPATIENT
Start: 2023-03-03

## 2023-03-03 NOTE — PATIENT INSTRUCTIONS
Refills sent to [facility] Referral for neurology ordered. Scheduling center to call with appointment. Improve on diet to reduce sodium. Stay hydrated with water.   Follow-up in 3 months for recheck blood pressure.

## 2023-03-03 NOTE — PROGRESS NOTES
"Chief Complaint  Establish Care, Headache (Headaches), Neck Pain, and Fatigue    Subjective        Alonzo Haque presents to [facility]  History of Present Illness  37 y.o. male presenting with headaches and to establish care. Previous patient of YUMIKO Yap. States has a shunt in his head, right side, that could be causing increased headaches. Has pressure behind his eye. Rubbing area seems to help. Pressure causing vision to become out of focus and he \"dazes off\". It often takes longer to return focus vision.  Works as a  at [email or web address] 8 hours a day.         Objective   Vital Signs:  /90   Pulse 80   Temp 98.6 °F (37 °C)   Ht 175.3 cm (69\")   SpO2 97%   BMI 26.58 kg/m²   Estimated body mass index is 26.58 kg/m² as calculated from the following:    Height as of this encounter: 175.3 cm (69\").    Weight as of 8/22/22: 81.6 kg (180 lb).             Physical Exam  Vitals reviewed.   Constitutional:       Appearance: Normal appearance.   Eyes:      Pupils: Pupils are equal, round, and reactive to light.   Musculoskeletal:      Cervical back: Normal range of motion.      Comments: Spina bifida   Skin:     General: Skin is warm and dry.      Capillary Refill: Capillary refill takes less than 2 seconds.   Neurological:      General: No focal deficit present.      Mental Status: He is alert and oriented to person, place, and time.   Psychiatric:         Mood and Affect: Mood normal.         Behavior: Behavior normal.         Thought Content: Thought content normal.         Judgment: Judgment normal.        Result Review :    Common labs    Common Labs 7/11/22 7/11/22 7/11/22 8/22/22    1230 1230 1230    Glucose  95     BUN  14     Creatinine  0.75 (A)     Sodium  140     Potassium  4.8     Chloride  103     Calcium  9.6     Total Protein  6.9     Albumin  4.2     Total Bilirubin  0.4     Alkaline Phosphatase  76     AST (SGOT)  18     ALT (SGPT)  19     WBC 14.4 (A)   7.4 "   Hemoglobin 14.8   14.6   Hematocrit 43.7   43.6   Platelets 244   289   Total Cholesterol   160    Triglycerides   98    HDL Cholesterol   35 (A)    LDL Cholesterol    107 (A)    (A) Abnormal value              Current Outpatient Medications on File Prior to Visit   Medication Sig Dispense Refill   • sertraline (Zoloft) 25 MG tablet Take 1 tablet by mouth Daily. 90 tablet 1     No current facility-administered medications on file prior to visit.              Assessment and Plan   Diagnoses and all orders for this visit:    1. Chronic tension-type headache, not intractable (Primary)  -     Ambulatory Referral to Neurology    2. Mixed hyperlipidemia  -     atorvastatin (LIPITOR) 20 MG tablet; Take 1 tablet by mouth Daily.  Dispense: 90 tablet; Refill: 1    3. Gastroesophageal reflux disease without esophagitis  -     omeprazole (priLOSEC) 40 MG capsule; TAKE ONE CAPSULE BY MOUTH DAILY.  Dispense: 90 capsule; Refill: 1    4. Encounter for medical examination to establish care      Patient Instructions   Refills sent to [facility] Referral for neurology ordered. Scheduling center to call with appointment. Improve on diet to reduce sodium. Stay hydrated with water.   Follow-up in 3 months for recheck blood pressure.            Follow Up   Return in about 3 months (around 6/3/2023) for Recheck.  Patient was given instructions and counseling regarding his condition or for health maintenance advice. Please see specific information pulled into the AVS if appropriate.

## 2023-06-07 ENCOUNTER — OFFICE VISIT (OUTPATIENT)
Dept: INTERNAL MEDICINE | Facility: CLINIC | Age: 38
End: 2023-06-07
Payer: MEDICARE

## 2023-06-07 VITALS
BODY MASS INDEX: 26.58 KG/M2 | OXYGEN SATURATION: 98 % | SYSTOLIC BLOOD PRESSURE: 144 MMHG | HEIGHT: 69 IN | HEART RATE: 88 BPM | DIASTOLIC BLOOD PRESSURE: 82 MMHG

## 2023-06-07 DIAGNOSIS — Z00.00 HEALTHCARE MAINTENANCE: Chronic | ICD-10-CM

## 2023-06-07 DIAGNOSIS — E78.2 MIXED HYPERLIPIDEMIA: Primary | Chronic | ICD-10-CM

## 2023-06-07 DIAGNOSIS — F34.1 DYSTHYMIA: ICD-10-CM

## 2023-06-07 RX ORDER — SERTRALINE HYDROCHLORIDE 25 MG/1
25 TABLET, FILM COATED ORAL DAILY
Qty: 90 TABLET | Refills: 1 | Status: SHIPPED | OUTPATIENT
Start: 2023-06-07

## 2023-06-07 NOTE — PATIENT INSTRUCTIONS
Continue medications as prescribed. Stay hydrated with water. Labs today. Follow-up for annual in 6 months.

## 2023-06-07 NOTE — PROGRESS NOTES
"Chief Complaint  Hyperlipidemia    Subjective        Alonzo Haque presents to [facility]  History of Present Illness  38-year-old male presenting with hyperlipidemia.  Headaches have improved.  Is scheduled to see neurology at the end of the month.  Tolerating atorvastatin for hyperlipidemia.  He is not checking his blood pressure at home.  Has tried to improve his diet by cutting back on creamer's in his coffee.  Hyperlipidemia      Objective   Vital Signs:  /82   Pulse 88   Ht 175.3 cm (69\")   SpO2 98%   BMI 26.58 kg/m²   Estimated body mass index is 26.58 kg/m² as calculated from the following:    Height as of this encounter: 175.3 cm (69\").    Weight as of 8/22/22: 81.6 kg (180 lb).       [unfilled]    Physical Exam  Vitals reviewed.   Constitutional:       Appearance: Normal appearance.   Musculoskeletal:         General: Normal range of motion.      Cervical back: Normal range of motion.   Skin:     General: Skin is warm and dry.      Capillary Refill: Capillary refill takes less than 2 seconds.   Neurological:      General: No focal deficit present.      Mental Status: He is alert and oriented to person, place, and time.   Psychiatric:         Mood and Affect: Mood normal.         Behavior: Behavior normal.         Thought Content: Thought content normal.         Judgment: Judgment normal.      Result Review :    Common labs          7/11/2022    12:30 8/22/2022    00:00 6/7/2023    12:29   Common Labs   Glucose 95   92    BUN 14   7    Creatinine 0.75   0.65    Sodium 140   140    Potassium 4.8   4.1    Chloride 103   103    Calcium 9.6   9.2    Total Protein 6.9   6.8    Albumin 4.2   4.2    Total Bilirubin 0.4   0.3    Alkaline Phosphatase 76   82    AST (SGOT) 18   19    ALT (SGPT) 19   29    WBC 14.4  7.4  10.4    Hemoglobin 14.8  14.6  15.0    Hematocrit 43.7  43.6  44.8    Platelets 244  289  275    Total Cholesterol 160   161    Triglycerides 98   110  "   HDL Cholesterol 35   32    LDL Cholesterol  107   109          Current Outpatient Medications on File Prior to Visit   Medication Sig Dispense Refill    atorvastatin (LIPITOR) 20 MG tablet Take 1 tablet by mouth Daily. 90 tablet 1    omeprazole (priLOSEC) 40 MG capsule TAKE ONE CAPSULE BY MOUTH DAILY. 90 capsule 1     No current facility-administered medications on file prior to visit.              Assessment and Plan   Diagnoses and all orders for this visit:    1. Mixed hyperlipidemia (Primary)  -     Comprehensive Metabolic Panel  -     Lipid Panel With / Chol / HDL Ratio  -     CBC & Differential    2. Dysthymia  -     sertraline (Zoloft) 25 MG tablet; Take 1 tablet by mouth Daily.  Dispense: 90 tablet; Refill: 1    3. Healthcare maintenance  -     Comprehensive Metabolic Panel  -     Lipid Panel With / Chol / HDL Ratio  -     CBC & Differential      Patient Instructions   Continue medications as prescribed. Stay hydrated with water. Labs today. Follow-up for annual in 6 months.            Follow Up   Return in about 6 months (around 12/7/2023) for Annual physical.  Patient was given instructions and counseling regarding his condition or for health maintenance advice. Please see specific information pulled into the AVS if appropriate.

## 2023-06-08 LAB
ALBUMIN SERPL-MCNC: 4.2 G/DL (ref 4–5)
ALBUMIN/GLOB SERPL: 1.6 {RATIO} (ref 1.2–2.2)
ALP SERPL-CCNC: 82 IU/L (ref 44–121)
ALT SERPL-CCNC: 29 IU/L (ref 0–44)
AST SERPL-CCNC: 19 IU/L (ref 0–40)
BASOPHILS # BLD AUTO: 0.1 X10E3/UL (ref 0–0.2)
BASOPHILS NFR BLD AUTO: 1 %
BILIRUB SERPL-MCNC: 0.3 MG/DL (ref 0–1.2)
BUN SERPL-MCNC: 7 MG/DL (ref 6–20)
BUN/CREAT SERPL: 11 (ref 9–20)
CALCIUM SERPL-MCNC: 9.2 MG/DL (ref 8.7–10.2)
CHLORIDE SERPL-SCNC: 103 MMOL/L (ref 96–106)
CHOLEST SERPL-MCNC: 161 MG/DL (ref 100–199)
CHOLEST/HDLC SERPL: 5 RATIO (ref 0–5)
CO2 SERPL-SCNC: 22 MMOL/L (ref 20–29)
CREAT SERPL-MCNC: 0.65 MG/DL (ref 0.76–1.27)
EGFRCR SERPLBLD CKD-EPI 2021: 124 ML/MIN/1.73
EOSINOPHIL # BLD AUTO: 0.3 X10E3/UL (ref 0–0.4)
EOSINOPHIL NFR BLD AUTO: 2 %
ERYTHROCYTE [DISTWIDTH] IN BLOOD BY AUTOMATED COUNT: 12.2 % (ref 11.6–15.4)
GLOBULIN SER CALC-MCNC: 2.6 G/DL (ref 1.5–4.5)
GLUCOSE SERPL-MCNC: 92 MG/DL (ref 70–99)
HCT VFR BLD AUTO: 44.8 % (ref 37.5–51)
HDLC SERPL-MCNC: 32 MG/DL
HGB BLD-MCNC: 15 G/DL (ref 13–17.7)
IMM GRANULOCYTES # BLD AUTO: 0 X10E3/UL (ref 0–0.1)
IMM GRANULOCYTES NFR BLD AUTO: 0 %
LDLC SERPL CALC-MCNC: 109 MG/DL (ref 0–99)
LYMPHOCYTES # BLD AUTO: 2.1 X10E3/UL (ref 0.7–3.1)
LYMPHOCYTES NFR BLD AUTO: 20 %
MCH RBC QN AUTO: 30.6 PG (ref 26.6–33)
MCHC RBC AUTO-ENTMCNC: 33.5 G/DL (ref 31.5–35.7)
MCV RBC AUTO: 91 FL (ref 79–97)
MONOCYTES # BLD AUTO: 0.7 X10E3/UL (ref 0.1–0.9)
MONOCYTES NFR BLD AUTO: 7 %
NEUTROPHILS # BLD AUTO: 7.2 X10E3/UL (ref 1.4–7)
NEUTROPHILS NFR BLD AUTO: 70 %
PLATELET # BLD AUTO: 275 X10E3/UL (ref 150–450)
POTASSIUM SERPL-SCNC: 4.1 MMOL/L (ref 3.5–5.2)
PROT SERPL-MCNC: 6.8 G/DL (ref 6–8.5)
RBC # BLD AUTO: 4.9 X10E6/UL (ref 4.14–5.8)
SODIUM SERPL-SCNC: 140 MMOL/L (ref 134–144)
TRIGL SERPL-MCNC: 110 MG/DL (ref 0–149)
VLDLC SERPL CALC-MCNC: 20 MG/DL (ref 5–40)
WBC # BLD AUTO: 10.4 X10E3/UL (ref 3.4–10.8)

## 2023-06-08 NOTE — PROGRESS NOTES
Metabolic panel is unremarkable.  Creatinine level slightly low but stable.  Kidney function otherwise perfect.  No sign of diabetes, liver injury or electrolyte imbalance.    LDL (bad cholesterol) slightly elevated at 109. Recommend limiting intake of red meat, pork, fried foods and high fat dairy products. Limit intake of alcohol. Recommend at least 30 minutes of heart elevating exercises three days a week as tolerated.  Continue atorvastatin as prescribed.    CBC is unremarkable.  No signs of infection or anemia.

## 2023-08-10 ENCOUNTER — TELEPHONE (OUTPATIENT)
Dept: INTERNAL MEDICINE | Facility: CLINIC | Age: 38
End: 2023-08-10
Payer: MEDICARE

## 2023-08-10 NOTE — TELEPHONE ENCOUNTER
Caller: Alonzo Haque    Relationship: Self    Best call back number:     Equipment requested: CATHETERS    Reason for the request:     Prescribing Provider:     Additional information or concerns: PATIENT IS CALLING IN TO SEE IF DR HARTLEY WILL WRITE A PRESCRIPTION FOR CATHETERS AND SEND TO [facility]  HE WANTS TO BE CALLED TO DISCUSS IN DETAIL.

## 2023-08-24 ENCOUNTER — TELEPHONE (OUTPATIENT)
Dept: NEUROLOGY | Facility: CLINIC | Age: 38
End: 2023-08-24
Payer: MEDICARE

## 2023-12-13 ENCOUNTER — OFFICE VISIT (OUTPATIENT)
Dept: INTERNAL MEDICINE | Facility: CLINIC | Age: 38
End: 2023-12-13
Payer: MEDICARE

## 2023-12-13 VITALS
OXYGEN SATURATION: 96 % | WEIGHT: 180 LBS | DIASTOLIC BLOOD PRESSURE: 80 MMHG | HEART RATE: 84 BPM | HEIGHT: 69 IN | SYSTOLIC BLOOD PRESSURE: 134 MMHG | BODY MASS INDEX: 26.66 KG/M2

## 2023-12-13 DIAGNOSIS — Z00.00 MEDICARE ANNUAL WELLNESS VISIT, SUBSEQUENT: ICD-10-CM

## 2023-12-13 DIAGNOSIS — K21.9 GASTROESOPHAGEAL REFLUX DISEASE WITHOUT ESOPHAGITIS: ICD-10-CM

## 2023-12-13 DIAGNOSIS — F34.1 DYSTHYMIA: ICD-10-CM

## 2023-12-13 DIAGNOSIS — Z23 IMMUNIZATION DUE: Primary | ICD-10-CM

## 2023-12-13 DIAGNOSIS — Q05.7 SPINA BIFIDA OF LUMBAR REGION WITHOUT HYDROCEPHALUS: Chronic | ICD-10-CM

## 2023-12-13 DIAGNOSIS — E78.2 MIXED HYPERLIPIDEMIA: Chronic | ICD-10-CM

## 2023-12-13 DIAGNOSIS — N31.9 NEUROGENIC BLADDER: Chronic | ICD-10-CM

## 2023-12-13 RX ORDER — SERTRALINE HYDROCHLORIDE 25 MG/1
25 TABLET, FILM COATED ORAL DAILY
Qty: 90 TABLET | Refills: 1 | Status: SHIPPED | OUTPATIENT
Start: 2023-12-13

## 2023-12-13 RX ORDER — ATORVASTATIN CALCIUM 20 MG/1
20 TABLET, FILM COATED ORAL DAILY
Qty: 90 TABLET | Refills: 1 | Status: SHIPPED | OUTPATIENT
Start: 2023-12-13

## 2023-12-13 RX ORDER — CATHETER 10 FR
1 EACH MISCELLANEOUS 3 TIMES DAILY
Qty: 150 EACH | Refills: 3 | Status: SHIPPED | OUTPATIENT
Start: 2023-12-13

## 2023-12-13 RX ORDER — OMEPRAZOLE 40 MG/1
40 CAPSULE, DELAYED RELEASE ORAL DAILY PRN
Qty: 90 CAPSULE | Refills: 1 | Status: SHIPPED | OUTPATIENT
Start: 2023-12-13

## 2023-12-13 NOTE — PATIENT INSTRUCTIONS
Medicare Wellness  Personal Prevention Plan of Service     Date of Office Visit:    Encounter Provider:  YUMIKO Vazquez  Place of Service:  [facility]  Patient Name: Alonzo Haque  :  1985    As part of the Medicare Wellness portion of your visit today, we are providing you with this personalized preventive plan of services (PPPS). This plan is based upon recommendations of the [place] Preventive Services Task Force (USPSTF) and the Advisory Committee on Immunization Practices (ACIP).    This lists the preventive care services that should be considered, and provides dates of when you are due. Items listed as completed are up-to-date and do not require any further intervention.    Health Maintenance   Topic Date Due    Pneumococcal Vaccine 0-64 (1 - PCV) Never done    TDAP/TD VACCINES (1 - Tdap) Never done    BMI FOLLOWUP  2023    COVID-19 Vaccine (3 - - season) 2023    LIPID PANEL  2024    ANNUAL WELLNESS VISIT  2024    HEPATITIS C SCREENING  Completed    INFLUENZA VACCINE  Completed       Orders Placed This Encounter   Procedures    Fluzone >6 Months (2393-1829)       Return in about 6 months (around 2024) for Recheck.        Marly continue medications as prescribed. Stay hydrated with water. Take order for catheters to [facility]. Recommend COVID booster in a week or later. Follow-up in 6 months for recheck and fasting labs.

## 2023-12-13 NOTE — PROGRESS NOTES
The ABCs of the Annual Wellness Visit  Subsequent Medicare Wellness Visit    Subjective    Alonzo Haque is a 38 y.o. male who presents for a Subsequent Medicare Wellness Visit.    The following portions of the patient's history were reviewed and   updated as appropriate: allergies, current medications, past family history, past medical history, past social history, past surgical history, and problem list.    Compared to one year ago, the patient feels his physical   health is better.    Compared to one year ago, the patient feels his mental   health is worse.    Recent Hospitalizations:  He was not admitted to the hospital during the last year.       Current Medical Providers:  Patient Care Team:  Herbert Hernandez APRN as PCP - General (Nurse Practitioner)    Outpatient Medications Prior to Visit   Medication Sig Dispense Refill    atorvastatin (LIPITOR) 20 MG tablet Take 1 tablet by mouth Daily. 90 tablet 1    omeprazole (priLOSEC) 40 MG capsule TAKE ONE CAPSULE BY MOUTH DAILY. 90 capsule 1    sertraline (Zoloft) 25 MG tablet Take 1 tablet by mouth Daily. 90 tablet 1    azithromycin (ZITHROMAX) 500 MG tablet 500 mg p.o. x1 on day 1, then 250 mg p.o. daily x 4 days (Patient not taking: Reported on 12/13/2023) 6 tablet 0    brompheniramine-pseudoephedrine-DM 30-2-10 MG/5ML syrup Take 5 mL by mouth 4 (Four) Times a Day As Needed for Allergies. (Patient not taking: Reported on 12/13/2023) 119 mL 0     No facility-administered medications prior to visit.       No opioid medication identified on active medication list. I have reviewed chart for other potential  high risk medication/s and harmful drug interactions in the elderly.        Aspirin is not on active medication list.  Aspirin use is not indicated based on review of current medical condition/s. Risk of harm outweighs potential benefits.  .    Patient Active Problem List   Diagnosis    Spina bifida of lumbar region without hydrocephalus    Neurogenic bladder     "Irritable bowel syndrome with both constipation and diarrhea    Current smoker    Healthcare maintenance    Gastroesophageal reflux disease without esophagitis    Mixed hyperlipidemia    Dysthymia     Advance Care Planning   Advance Care Planning     Advance Directive is not on file.  ACP discussion was declined by the patient. Patient has an advance directive (not in EMR), copy requested. Patient does not have an advance directive, declines further assistance.     Objective    Vitals:    23 1036   BP: 134/80   BP Location: Left arm   Patient Position: Sitting   Cuff Size: Adult   Pulse: 84   SpO2: 96%   Weight: 81.6 kg (180 lb)   Height: 175.3 cm (69\")     Estimated body mass index is 26.58 kg/m² as calculated from the following:    Height as of this encounter: 175.3 cm (69\").    Weight as of this encounter: 81.6 kg (180 lb).    BMI is >= 25 and <30. (Overweight) The following options were offered after discussion;: exercise counseling/recommendations and nutrition counseling/recommendations      Does the patient have evidence of cognitive impairment? No          HEALTH RISK ASSESSMENT    Smoking Status:  Social History     Tobacco Use   Smoking Status Every Day    Packs/day: 0.50    Years: 3.00    Additional pack years: 0.00    Total pack years: 1.50    Types: Cigarettes    Start date: 2001   Smokeless Tobacco Never     Alcohol Consumption:  Social History     Substance and Sexual Activity   Alcohol Use Yes    Comment: Alchol consumption about once a month     Fall Risk Screen:    ELIZABETH Fall Risk Assessment has not been completed.    Depression Screening:      3/3/2023     2:02 PM   PHQ-2/PHQ-9 Depression Screening   Little Interest or Pleasure in Doing Things 0-->not at all   Feeling Down, Depressed or Hopeless 0-->not at all   PHQ-9: Brief Depression Severity Measure Score 0       Health Habits and Functional and Cognitive Screenin/11/2022    11:30 AM   Functional & Cognitive Status   Do you " have difficulty preparing food and eating? No   Do you have difficulty bathing yourself, getting dressed or grooming yourself? No   Do you have difficulty using the toilet? No   Do you have difficulty moving around from place to place? No   Do you have trouble with steps or getting out of a bed or a chair? No   Current Diet Well Balanced Diet   Dental Exam Not up to date   Eye Exam Not up to date   Exercise (times per week) 0 times per week   Current Exercises Include No Regular Exercise   Do you need help using the phone?  No   Are you deaf or do you have serious difficulty hearing?  No   Do you need help to go to places out of walking distance? No   Do you need help shopping? No   Do you need help preparing meals?  No   Do you need help with housework?  No   Do you need help with laundry? No   Do you need help taking your medications? No   Do you need help managing money? No   Do you ever drive or ride in a car without wearing a seat belt? No   Have you felt unusual stress, anger or loneliness in the last month? Yes   Who do you live with? Alone   If you need help, do you have trouble finding someone available to you? No   Have you been bothered in the last four weeks by sexual problems? No   Do you have difficulty concentrating, remembering or making decisions? No       Age-appropriate Screening Schedule:  Refer to the list below for future screening recommendations based on patient's age, sex and/or medical conditions. Orders for these recommended tests are listed in the plan section. The patient has been provided with a written plan.    Health Maintenance   Topic Date Due    Pneumococcal Vaccine 0-64 (1 - PCV) Never done    TDAP/TD VACCINES (1 - Tdap) Never done    BMI FOLLOWUP  07/11/2023    COVID-19 Vaccine (3 - 2023-24 season) 09/01/2023    LIPID PANEL  06/07/2024    ANNUAL WELLNESS VISIT  12/13/2024    HEPATITIS C SCREENING  Completed    INFLUENZA VACCINE  Completed                  CMS Preventative  "Services Quick Reference  Risk Factors Identified During Encounter  None Identified  The above risks/problems have been discussed with the patient.  Pertinent information has been shared with the patient in the After Visit Summary.  An After Visit Summary and PPPS were made available to the patient.    Follow Up:   Next Medicare Wellness visit to be scheduled in 1 year.       Additional E&M Note during same encounter follows:  Patient has multiple medical problems which are significant and separately identifiable that require additional work above and beyond the Medicare Wellness Visit.      Chief Complaint  Medicare Wellness-subsequent    Subjective        38-year-old male presenting for Medicare wellness.  Working as a .  Has improved diet to limit intake of alcohol and overall quantity of food.  He is in a new romantic relationship and is happy overall.  Did not get refills of medications and is not taking the Zoloft or atorvastatin in 4 months.  Has noticed being quick to anger occasionally.  Would like to resume Zoloft.  No other health concerns at this time.  Straight cath 3 times a day and would like for supplies to be sent to [facility].      Alonzo Haque is also being seen today for Medicare wellness, neurogenic bladder, dysthymia and hyperlipidemia    Review of Systems   All other systems reviewed and are negative.      Objective   Vital Signs:  /80 (BP Location: Left arm, Patient Position: Sitting, Cuff Size: Adult)   Pulse 84   Ht 175.3 cm (69\")   Wt 81.6 kg (180 lb)   SpO2 96%   BMI 26.58 kg/m²     Physical Exam  Vitals reviewed.   Constitutional:       Appearance: Normal appearance.   Cardiovascular:      Rate and Rhythm: Normal rate and regular rhythm.      Pulses: Normal pulses.      Heart sounds: Normal heart sounds.   Pulmonary:      Effort: Pulmonary effort is normal.      Breath sounds: Normal breath sounds.   Musculoskeletal:      Cervical back: Normal range of motion.      Comments: " Baseline   Skin:     General: Skin is warm and dry.      Capillary Refill: Capillary refill takes less than 2 seconds.   Neurological:      General: No focal deficit present.      Mental Status: He is alert and oriented to person, place, and time.   Psychiatric:         Mood and Affect: Mood normal.         Behavior: Behavior normal.         Thought Content: Thought content normal.         Judgment: Judgment normal.            Common labs          6/7/2023    12:29   Common Labs   Glucose 92    BUN 7    Creatinine 0.65    Sodium 140    Potassium 4.1    Chloride 103    Calcium 9.2    Total Protein 6.8    Albumin 4.2    Total Bilirubin 0.3    Alkaline Phosphatase 82    AST (SGOT) 19    ALT (SGPT) 29    WBC 10.4    Hemoglobin 15.0    Hematocrit 44.8    Platelets 275    Total Cholesterol 161    Triglycerides 110    HDL Cholesterol 32    LDL Cholesterol  109            Current Outpatient Medications on File Prior to Visit   Medication Sig Dispense Refill    [DISCONTINUED] atorvastatin (LIPITOR) 20 MG tablet Take 1 tablet by mouth Daily. 90 tablet 1    [DISCONTINUED] omeprazole (priLOSEC) 40 MG capsule TAKE ONE CAPSULE BY MOUTH DAILY. 90 capsule 1    [DISCONTINUED] sertraline (Zoloft) 25 MG tablet Take 1 tablet by mouth Daily. 90 tablet 1    [DISCONTINUED] azithromycin (ZITHROMAX) 500 MG tablet 500 mg p.o. x1 on day 1, then 250 mg p.o. daily x 4 days (Patient not taking: Reported on 12/13/2023) 6 tablet 0    [DISCONTINUED] brompheniramine-pseudoephedrine-DM 30-2-10 MG/5ML syrup Take 5 mL by mouth 4 (Four) Times a Day As Needed for Allergies. (Patient not taking: Reported on 12/13/2023) 119 mL 0     No current facility-administered medications on file prior to visit.          Assessment and Plan   Diagnoses and all orders for this visit:    1. Immunization due (Primary)  -     Fluzone >6 Months (4007-5761)    2. Mixed hyperlipidemia  -     atorvastatin (LIPITOR) 20 MG tablet; Take 1 tablet by mouth Daily.  Dispense: 90  tablet; Refill: 1    3. Dysthymia  -     sertraline (Zoloft) 25 MG tablet; Take 1 tablet by mouth Daily.  Dispense: 90 tablet; Refill: 1    4. Spina bifida of lumbar region without hydrocephalus  -     Catheters (InCare Straight 14FR/20CM) misc; Use 1 Application 3 (Three) Times a Day.  Dispense: 150 each; Refill: 3    5. Neurogenic bladder  -     Catheters (InCare Straight 14FR/20CM) misc; Use 1 Application 3 (Three) Times a Day.  Dispense: 150 each; Refill: 3    6. Gastroesophageal reflux disease without esophagitis  -     omeprazole (priLOSEC) 40 MG capsule; Take 1 capsule by mouth Daily As Needed (Heartburn). TAKE ONE CAPSULE BY MOUTH DAILY.  Dispense: 90 capsule; Refill: 1    7. Medicare annual wellness visit, subsequent      Patient Instructions     Medicare Wellness  Personal Prevention Plan of Service     Date of Office Visit:    Encounter Provider:  YUMIKO Vazquez  Place of Service:  [facility]  Patient Name: Alonzo Haque  :  1985    As part of the Medicare Wellness portion of your visit today, we are providing you with this personalized preventive plan of services (PPPS). This plan is based upon recommendations of the [place] Preventive Services Task Force (USPSTF) and the Advisory Committee on Immunization Practices (ACIP).    This lists the preventive care services that should be considered, and provides dates of when you are due. Items listed as completed are up-to-date and do not require any further intervention.    Health Maintenance   Topic Date Due    Pneumococcal Vaccine 0-64 (1 - PCV) Never done    TDAP/TD VACCINES (1 - Tdap) Never done    BMI FOLLOWUP  2023    COVID-19 Vaccine (3 - - season) 2023    LIPID PANEL  2024    ANNUAL WELLNESS VISIT  2024    HEPATITIS C SCREENING  Completed    INFLUENZA VACCINE  Completed       Orders Placed This Encounter   Procedures    Fluzone >6 Months ([id number])       Return in about 6  months (around 6/13/2024) for Recheck.        Marly continue medications as prescribed. Stay hydrated with water. Take order for catheters to [facility]. Recommend COVID booster in a week or later. Follow-up in 6 months for recheck and fasting labs.            Follow Up   Return in about 6 months (around 6/13/2024) for Recheck.  Patient was given instructions and counseling regarding his condition or for health maintenance advice. Please see specific information pulled into the AVS if appropriate.

## 2024-06-03 NOTE — TELEPHONE ENCOUNTER
[facility]     Emergency Department     Faculty Attestation    I performed a history and physical examination of the patient and discussed management with the resident. I reviewed the resident’s note and agree with the documented findings and plan of care. Any areas of disagreement are noted on the chart. I was personally present for the key portions of any procedures. I have documented in the chart those procedures where I was not present during the key portions. I have reviewed the emergency nurses triage note. I agree with the chief complaint, past medical history, past surgical history, allergies, medications, social and family history as documented unless otherwise noted below.   For Physician Assistant/ Nurse Practitioner cases/documentation I have personally evaluated this patient and have completed at least one if not all key elements of the E/M (history, physical exam, and MDM). Additional findings are as noted.      Primary Care Physician:  No primary care provider on file.    CHIEF COMPLAINT       Chief Complaint   Patient presents with    Dysphagia       RECENT VITALS:   Temp: 98.1 °F (36.7 °C),  Pulse: 61, Respirations: 18, BP: 113/70    LABS:  Labs Reviewed - No data to display      PERTINENT ATTENDING PHYSICIAN COMMENTS:    Patient here with discomfort in swallowing after eating a bagel recently gave birth had a  patient said her belly is hurting and the  incisions well which looks good patient is having secretions able to eat and drink fine no difficulty breathing no shortness of breath x-ray of the neck did reveal evidence of enlarged adenoids and lingular tonsils we will treat her as a tonsillitis    Critical Care          Abdelrahman Alfaro MD,  MD, FAAEM  Attending Emergency  Physician              Abdelrahman Alfaro MD  [phone]     Yes, thank you

## 2024-06-12 ENCOUNTER — OFFICE VISIT (OUTPATIENT)
Dept: INTERNAL MEDICINE | Facility: CLINIC | Age: 39
End: 2024-06-12
Payer: MEDICARE

## 2024-06-12 VITALS
DIASTOLIC BLOOD PRESSURE: 84 MMHG | WEIGHT: 180 LBS | SYSTOLIC BLOOD PRESSURE: 126 MMHG | HEART RATE: 78 BPM | TEMPERATURE: 97.8 F | BODY MASS INDEX: 26.66 KG/M2 | OXYGEN SATURATION: 97 % | HEIGHT: 69 IN

## 2024-06-12 DIAGNOSIS — F34.1 DYSTHYMIA: Chronic | ICD-10-CM

## 2024-06-12 DIAGNOSIS — Z92.89 HISTORY OF CREATION OF VENTRICULOPERITONEAL SHUNT: ICD-10-CM

## 2024-06-12 DIAGNOSIS — E78.2 MIXED HYPERLIPIDEMIA: Primary | Chronic | ICD-10-CM

## 2024-06-12 DIAGNOSIS — R09.81 SINUS CONGESTION: ICD-10-CM

## 2024-06-12 LAB
ALBUMIN SERPL-MCNC: 4 G/DL (ref 3.5–5.2)
ALBUMIN/GLOB SERPL: 1.7 G/DL
ALP SERPL-CCNC: 77 U/L (ref 39–117)
ALT SERPL-CCNC: 10 U/L (ref 1–41)
AST SERPL-CCNC: 12 U/L (ref 1–40)
BILIRUB SERPL-MCNC: 0.4 MG/DL (ref 0–1.2)
BUN SERPL-MCNC: 6 MG/DL (ref 6–20)
BUN/CREAT SERPL: 7.9 (ref 7–25)
CALCIUM SERPL-MCNC: 9.4 MG/DL (ref 8.6–10.5)
CHLORIDE SERPL-SCNC: 103 MMOL/L (ref 98–107)
CHOLEST SERPL-MCNC: 224 MG/DL (ref 0–200)
CHOLEST/HDLC SERPL: 6.79 {RATIO}
CO2 SERPL-SCNC: 26.9 MMOL/L (ref 22–29)
CREAT SERPL-MCNC: 0.76 MG/DL (ref 0.76–1.27)
EGFRCR SERPLBLD CKD-EPI 2021: 117.3 ML/MIN/1.73
GLOBULIN SER CALC-MCNC: 2.4 GM/DL
GLUCOSE SERPL-MCNC: 102 MG/DL (ref 65–99)
HDLC SERPL-MCNC: 33 MG/DL (ref 40–60)
LDLC SERPL CALC-MCNC: 172 MG/DL (ref 0–100)
POTASSIUM SERPL-SCNC: 5 MMOL/L (ref 3.5–5.2)
PROT SERPL-MCNC: 6.4 G/DL (ref 6–8.5)
SODIUM SERPL-SCNC: 139 MMOL/L (ref 136–145)
TRIGL SERPL-MCNC: 102 MG/DL (ref 0–150)
VLDLC SERPL CALC-MCNC: 19 MG/DL (ref 5–40)

## 2024-06-12 PROCEDURE — 1126F AMNT PAIN NOTED NONE PRSNT: CPT

## 2024-06-12 PROCEDURE — 1159F MED LIST DOCD IN RCRD: CPT

## 2024-06-12 PROCEDURE — 99214 OFFICE O/P EST MOD 30 MIN: CPT

## 2024-06-12 PROCEDURE — 1160F RVW MEDS BY RX/DR IN RCRD: CPT

## 2024-06-12 RX ORDER — AZITHROMYCIN 250 MG/1
TABLET, FILM COATED ORAL
Qty: 6 TABLET | Refills: 0 | Status: SHIPPED | OUTPATIENT
Start: 2024-06-12

## 2024-06-12 NOTE — PATIENT INSTRUCTIONS
Take azithromycin as prescribed. Stay hydrated with water. Keep up with two small meals a day. Labs today. Follow-up in 6 months for medicare wellness and fasting labs.

## 2024-06-12 NOTE — PROGRESS NOTES
"Chief Complaint  Follow-up and Hyperlipidemia    Subjective        Alonzo Haque presents to [facility]  History of Present Illness  39-year-old male presenting for hyperlipidemia follow-up.  Started working 6 days a week instead of 5.  Doing well overall.  Started having sinus and chest congestion about 10 days ago.  Has been coughing up yellow phlegm in the morning and then it turns into white phlegm later in the day.  States he has some ear pain and sinus pressure.  Denies sore throat, fever, body aches or changes in appetite.  Has not been taking anything for allergies.    Taking atorvastatin 20 mg daily for hyperlipidemia.  Tolerating well.    Taking sertraline for dysthymia.  Tolerating well.    Has not been seen by neurology to evaluate and manage ventriculoperitoneal shunt.  Hyperlipidemia        Objective   Vital Signs:  /84 (BP Location: Right arm, Patient Position: Sitting, Cuff Size: Adult)   Pulse 78   Temp 97.8 °F (36.6 °C)   Ht 175.3 cm (69\")   Wt 81.6 kg (180 lb)   SpO2 97%   BMI 26.58 kg/m²   Estimated body mass index is 26.58 kg/m² as calculated from the following:    Height as of this encounter: 175.3 cm (69\").    Weight as of this encounter: 81.6 kg (180 lb).               Physical Exam  Vitals reviewed.   Constitutional:       Appearance: Normal appearance.   Musculoskeletal:         General: Normal range of motion.      Cervical back: Normal range of motion.      Comments: Baseline   Skin:     General: Skin is warm and dry.      Capillary Refill: Capillary refill takes less than 2 seconds.   Neurological:      General: No focal deficit present.      Mental Status: He is alert and oriented to person, place, and time.   Psychiatric:         Mood and Affect: Mood normal.         Behavior: Behavior normal.         Thought Content: Thought content normal.         Judgment: Judgment normal.        Result Review :            Current Outpatient Medications on File " Prior to Visit   Medication Sig Dispense Refill    atorvastatin (LIPITOR) 20 MG tablet Take 1 tablet by mouth Daily. 90 tablet 1    Catheters (InCare Straight 14FR/20CM) misc Use 1 Application 3 (Three) Times a Day. 150 each 3    omeprazole (priLOSEC) 40 MG capsule Take 1 capsule by mouth Daily As Needed (Heartburn). TAKE ONE CAPSULE BY MOUTH DAILY. 90 capsule 1    sertraline (Zoloft) 25 MG tablet Take 1 tablet by mouth Daily. 90 tablet 1     No current facility-administered medications on file prior to visit.                Assessment and Plan     Diagnoses and all orders for this visit:    1. Mixed hyperlipidemia (Primary)  -     Comprehensive Metabolic Panel  -     Lipid Panel With / Chol / HDL Ratio    2. Sinus congestion  -     azithromycin (Zithromax Z-Durga) 250 MG tablet; Take 2 tablets by mouth on day 1, then 1 tablet daily on days 2-5  Dispense: 6 tablet; Refill: 0    3. Dysthymia    4. History of creation of ventriculoperitoneal shunt        Patient Instructions   Take azithromycin as prescribed. Stay hydrated with water. Keep up with two small meals a day. Labs today. Follow-up in 6 months for medicare wellness and fasting labs.            Follow Up     Return in about 6 months (around 12/12/2024) for Medicare Wellness.  Patient was given instructions and counseling regarding his condition or for health maintenance advice. Please see specific information pulled into the AVS if appropriate.

## 2024-06-13 ENCOUNTER — TELEPHONE (OUTPATIENT)
Dept: INTERNAL MEDICINE | Facility: CLINIC | Age: 39
End: 2024-06-13
Payer: MEDICARE

## 2024-06-13 DIAGNOSIS — E78.2 MIXED HYPERLIPIDEMIA: Primary | ICD-10-CM

## 2024-06-13 RX ORDER — ATORVASTATIN CALCIUM 40 MG/1
40 TABLET, FILM COATED ORAL DAILY
Qty: 90 TABLET | Refills: 1 | Status: SHIPPED | OUTPATIENT
Start: 2024-06-13

## 2024-06-13 NOTE — PROGRESS NOTES
Metabolic panel is unremarkable.  No signs of liver injury, kidney injury or electrolyte imbalance.  Very slightly elevated fasting blood sugar.  Will continue to monitor in the future.    LDL (bad cholesterol) is elevated significantly in the past year.  Currently 172.  Previous level of 109.  Goal of 100 or lower.  Recommend limiting intake of red meat, pork, fried foods and high fat dairy products. Recommend increasing atorvastatin to 40 mg daily.  New prescription will be sent to [facility] pharmacy.

## 2024-06-13 NOTE — TELEPHONE ENCOUNTER
Attempted to call regarding lab results. Unable to LVM.     Hub okantonio to relay:   Metabolic panel is unremarkable.  No signs of liver injury, kidney injury or electrolyte imbalance.  Very slightly elevated fasting blood sugar.  Will continue to monitor in the future.     LDL (bad cholesterol) is elevated significantly in the past year.  Currently 172.  Previous level of 109.  Goal of 100 or lower.  Recommend limiting intake of red meat, pork, fried foods and high fat dairy products. Recommend increasing atorvastatin to 40 mg daily.  New prescription will be sent to [facility] pharmacy.     Please note whether patient is agreeable to increasing atorvastatin dose.

## 2024-12-03 ENCOUNTER — TELEPHONE (OUTPATIENT)
Dept: INTERNAL MEDICINE | Facility: CLINIC | Age: 39
End: 2024-12-03

## 2024-12-03 NOTE — TELEPHONE ENCOUNTER
Alonzo Haque called office today needs a order for Catheters Jaycemedina dorado 14 FR/20 CM 3 Refills quantity of 150 use 1 application 3 (three) times a day diagnosis Spina bifida of lumbar region without hydrocephalus Q05.7,n31.9 Neurofenic bladder Please send to [facility] Fax number [phone]

## 2024-12-18 ENCOUNTER — OFFICE VISIT (OUTPATIENT)
Dept: INTERNAL MEDICINE | Facility: CLINIC | Age: 39
End: 2024-12-18
Payer: MEDICARE

## 2024-12-18 VITALS
OXYGEN SATURATION: 96 % | SYSTOLIC BLOOD PRESSURE: 140 MMHG | TEMPERATURE: 98.7 F | RESPIRATION RATE: 20 BRPM | DIASTOLIC BLOOD PRESSURE: 90 MMHG | HEART RATE: 90 BPM

## 2024-12-18 DIAGNOSIS — K21.9 GASTROESOPHAGEAL REFLUX DISEASE WITHOUT ESOPHAGITIS: ICD-10-CM

## 2024-12-18 DIAGNOSIS — N31.9 NEUROGENIC BLADDER: ICD-10-CM

## 2024-12-18 DIAGNOSIS — E78.2 MIXED HYPERLIPIDEMIA: Primary | ICD-10-CM

## 2024-12-18 DIAGNOSIS — F34.1 DYSTHYMIA: Chronic | ICD-10-CM

## 2024-12-18 DIAGNOSIS — Z00.00 MEDICARE ANNUAL WELLNESS VISIT, SUBSEQUENT: ICD-10-CM

## 2024-12-18 DIAGNOSIS — Q05.7 SPINA BIFIDA OF LUMBAR REGION WITHOUT HYDROCEPHALUS: ICD-10-CM

## 2024-12-18 PROCEDURE — 1160F RVW MEDS BY RX/DR IN RCRD: CPT

## 2024-12-18 PROCEDURE — 1126F AMNT PAIN NOTED NONE PRSNT: CPT

## 2024-12-18 PROCEDURE — 1159F MED LIST DOCD IN RCRD: CPT

## 2024-12-18 PROCEDURE — G0439 PPPS, SUBSEQ VISIT: HCPCS

## 2024-12-18 PROCEDURE — 1170F FXNL STATUS ASSESSED: CPT

## 2024-12-18 RX ORDER — CATHETER 10 FR
1 EACH MISCELLANEOUS 3 TIMES DAILY
Qty: 150 EACH | Refills: 3 | Status: SHIPPED | OUTPATIENT
Start: 2024-12-18

## 2024-12-18 RX ORDER — SERTRALINE HYDROCHLORIDE 25 MG/1
25 TABLET, FILM COATED ORAL DAILY
Qty: 90 TABLET | Refills: 1 | Status: SHIPPED | OUTPATIENT
Start: 2024-12-18

## 2024-12-18 RX ORDER — OMEPRAZOLE 40 MG/1
40 CAPSULE, DELAYED RELEASE ORAL DAILY PRN
Qty: 90 CAPSULE | Refills: 1 | Status: SHIPPED | OUTPATIENT
Start: 2024-12-18

## 2024-12-18 RX ORDER — ATORVASTATIN CALCIUM 40 MG/1
40 TABLET, FILM COATED ORAL DAILY
Qty: 90 TABLET | Refills: 1 | Status: SHIPPED | OUTPATIENT
Start: 2024-12-18

## 2024-12-18 NOTE — PATIENT INSTRUCTIONS
Continue medications as prescribed. Stay hydrated with water. Take sertraline 25 mg daily for seasonal depression. Follow-up in 6 months for recheck and fasting labs.       Medicare Wellness  Personal Prevention Plan of Service     Date of Office Visit:    Encounter Provider:  YUMIKO Vazquez  Place of Service:  [facility]  Patient Name: Alonzo Haque  :  1985    As part of the Medicare Wellness portion of your visit today, we are providing you with this personalized preventive plan of services (PPPS). This plan is based upon recommendations of the [place] Preventive Services Task Force (USPSTF) and the Advisory Committee on Immunization Practices (ACIP).    This lists the preventive care services that should be considered, and provides dates of when you are due. Items listed as completed are up-to-date and do not require any further intervention.    Health Maintenance   Topic Date Due    Pneumococcal Vaccine 0-64 (1 of 2 - PCV) Never done    TDAP/TD VACCINES (1 - Tdap) Never done    INFLUENZA VACCINE  2024    COVID-19 Vaccine (3 - - season) 2024    ANNUAL WELLNESS VISIT  2024    BMI FOLLOWUP  2024    LIPID PANEL  2025    HEPATITIS C SCREENING  Completed       No orders of the defined types were placed in this encounter.      Return in about 6 months (around 2025) for Recheck.

## 2024-12-18 NOTE — ASSESSMENT & PLAN NOTE
Orders:    omeprazole (priLOSEC) 40 MG capsule; Take 1 capsule by mouth Daily As Needed (Heartburn). TAKE ONE CAPSULE BY MOUTH DAILY.

## 2024-12-18 NOTE — ASSESSMENT & PLAN NOTE
Orders:    Catheters (InCare Straight 14FR/20CM) misc; Use 1 Application 3 (Three) Times a Day.

## 2024-12-18 NOTE — PROGRESS NOTES
Subjective   The ABCs of the Annual Wellness Visit  Medicare Wellness Visit      Alonzo Haque is a 39 y.o. patient who presents for a Medicare Wellness Visit.    The following portions of the patient's history were reviewed and   updated as appropriate: allergies, current medications, past family history, past medical history, past social history, past surgical history, and problem list.    Compared to one year ago, the patient's physical   health is the same.  Compared to one year ago, the patient's mental   health is the same.    Recent Hospitalizations:  He was not admitted to the hospital during the last year.     Current Medical Providers:  Patient Care Team:  Herbert Hernandez APRN as PCP - General (Nurse Practitioner)    Outpatient Medications Prior to Visit   Medication Sig Dispense Refill    atorvastatin (LIPITOR) 40 MG tablet Take 1 tablet by mouth Daily. 90 tablet 1    Catheters (InCare Straight 14FR/20CM) misc Use 1 Application 3 (Three) Times a Day. 150 each 3    omeprazole (priLOSEC) 40 MG capsule Take 1 capsule by mouth Daily As Needed (Heartburn). TAKE ONE CAPSULE BY MOUTH DAILY. 90 capsule 1    sertraline (Zoloft) 25 MG tablet Take 1 tablet by mouth Daily. (Patient not taking: Reported on 12/18/2024) 90 tablet 1    azithromycin (Zithromax Z-Durga) 250 MG tablet Take 2 tablets by mouth on day 1, then 1 tablet daily on days 2-5 (Patient not taking: Reported on 12/18/2024) 6 tablet 0     No facility-administered medications prior to visit.     No opioid medication identified on active medication list. I have reviewed chart for other potential  high risk medication/s and harmful drug interactions in the elderly.      Aspirin is not on active medication list.  Aspirin use is not indicated based on review of current medical condition/s. Risk of harm outweighs potential benefits.  .    Patient Active Problem List   Diagnosis    Spina bifida of lumbar region without hydrocephalus    Neurogenic bladder    Irritable  "bowel syndrome with both constipation and diarrhea    Current smoker    Healthcare maintenance    Gastroesophageal reflux disease without esophagitis    Mixed hyperlipidemia    Dysthymia     Advance Care Planning Advance Directive is not on file.  ACP discussion was declined by the patient. Patient does not have an advance directive, declines further assistance.            Objective   Vitals:    24 1111   BP: 140/90   BP Location: Left arm   Pulse: 90   Resp: 20   Temp: 98.7 °F (37.1 °C)   TempSrc: Oral   SpO2: 96%   Weight: Comment: in wheelchair   PainSc: 0-No pain       Estimated body mass index is 26.58 kg/m² as calculated from the following:    Height as of 24: 175.3 cm (69\").    Weight as of 24: 81.6 kg (180 lb).    BMI is >= 25 and <30. (Overweight) The following options were offered after discussion;: exercise counseling/recommendations and nutrition counseling/recommendations           Does the patient have evidence of cognitive impairment? No                                                                                                Health  Risk Assessment    Smoking Status:  Social History     Tobacco Use   Smoking Status Every Day    Current packs/day: 0.50    Average packs/day: 0.5 packs/day for 24.0 years (12.0 ttl pk-yrs)    Types: Cigarettes    Start date: 2001   Smokeless Tobacco Never     Alcohol Consumption:  Social History     Substance and Sexual Activity   Alcohol Use Yes    Comment: Alchol consumption about once a month       Fall Risk Screen  STEADI Fall Risk Assessment was completed, and patient is at LOW risk for falls.Assessment completed on:2024    Depression Screening   Little interest or pleasure in doing things? Not at all   Feeling down, depressed, or hopeless? Several days   PHQ-2 Total Score 1      Health Habits and Functional and Cognitive Screenin/18/2024    11:16 AM   Functional & Cognitive Status   Do you have difficulty preparing food and " eating? No   Do you have difficulty bathing yourself, getting dressed or grooming yourself? No   Do you have difficulty using the toilet? No   Do you have difficulty moving around from place to place? No   Do you have trouble with steps or getting out of a bed or a chair? No   Current Diet Unhealthy Diet   Dental Exam Not up to date   Eye Exam Not up to date   Exercise (times per week) 0 times per week   Current Exercises Include No Regular Exercise   Do you need help using the phone?  No   Are you deaf or do you have serious difficulty hearing?  No   Do you need help to go to places out of walking distance? Yes   Do you need help shopping? No   Do you need help preparing meals?  No   Do you need help with housework?  No   Do you need help with laundry? No   Do you need help taking your medications? No   Do you need help managing money? No   Do you ever drive or ride in a car without wearing a seat belt? No   Have you felt unusual stress, anger or loneliness in the last month? Yes   Who do you live with? Alone   If you need help, do you have trouble finding someone available to you? No   Have you been bothered in the last four weeks by sexual problems? No   Do you have difficulty concentrating, remembering or making decisions? No           Age-appropriate Screening Schedule:  Refer to the list below for future screening recommendations based on patient's age, sex and/or medical conditions. Orders for these recommended tests are listed in the plan section. The patient has been provided with a written plan.    Health Maintenance List  Health Maintenance   Topic Date Due    Pneumococcal Vaccine 0-64 (1 of 2 - PCV) Never done    TDAP/TD VACCINES (1 - Tdap) Never done    INFLUENZA VACCINE  07/01/2024    COVID-19 Vaccine (3 - 2024-25 season) 09/01/2024    ANNUAL WELLNESS VISIT  12/13/2024    BMI FOLLOWUP  12/13/2024    LIPID PANEL  06/12/2025    HEPATITIS C SCREENING  Completed                                                                                                                                                 CMS Preventative Services Quick Reference  Risk Factors Identified During Encounter  None Identified    The above risks/problems have been discussed with the patient.  Pertinent information has been shared with the patient in the After Visit Summary.  An After Visit Summary and PPPS were made available to the patient.    Follow Up:   Next Medicare Wellness visit to be scheduled in 1 year.         Additional E&M Note during same encounter follows:  Patient has additional, significant, and separately identifiable condition(s)/problem(s) that require work above and beyond the Medicare Wellness Visit     Chief Complaint  Medicare Wellness-subsequent    Subjective   HPI  Alonzo is also being seen today for additional medical problem/s.    Review of Systems   Musculoskeletal:         Baseline   Neurological:         Baseline   All other systems reviewed and are negative.             Objective   Vital Signs:  /90 (BP Location: Left arm)   Pulse 90   Temp 98.7 °F (37.1 °C) (Oral)   Resp 20   SpO2 96%   Physical Exam  Vitals reviewed.   Constitutional:       Appearance: Normal appearance.   Cardiovascular:      Rate and Rhythm: Normal rate and regular rhythm.      Pulses: Normal pulses.      Heart sounds: Normal heart sounds.   Pulmonary:      Effort: Pulmonary effort is normal.      Breath sounds: Normal breath sounds.   Musculoskeletal:         General: Normal range of motion.      Cervical back: Normal range of motion.      Comments: Baseline   Skin:     General: Skin is warm and dry.      Capillary Refill: Capillary refill takes less than 2 seconds.   Neurological:      General: No focal deficit present.      Mental Status: He is alert and oriented to person, place, and time.      Comments: Baseline   Psychiatric:         Mood and Affect: Mood normal.         Behavior: Behavior normal.         Thought Content: Thought  content normal.         Judgment: Judgment normal.             Common labs          2024    11:59   Common Labs   Glucose 102    BUN 6    Creatinine 0.76    Sodium 139    Potassium 5.0    Chloride 103    Calcium 9.4    Total Protein 6.4    Albumin 4.0    Total Bilirubin 0.4    Alkaline Phosphatase 77    AST (SGOT) 12    ALT (SGPT) 10    Total Cholesterol 224    Triglycerides 102    HDL Cholesterol 33    LDL Cholesterol  172              Assessment and Plan            Mixed hyperlipidemia       Orders:    atorvastatin (LIPITOR) 40 MG tablet; Take 1 tablet by mouth Daily.    Spina bifida of lumbar region without hydrocephalus    Orders:    Catheters (InCare Straight 14FR/20CM) misc; Use 1 Application 3 (Three) Times a Day.    Neurogenic bladder    Orders:    Catheters (InCare Straight 14FR/20CM) misc; Use 1 Application 3 (Three) Times a Day.    Medicare annual wellness visit, subsequent         Dysthymia      Orders:    sertraline (Zoloft) 25 MG tablet; Take 1 tablet by mouth Daily.    Gastroesophageal reflux disease without esophagitis    Orders:    omeprazole (priLOSEC) 40 MG capsule; Take 1 capsule by mouth Daily As Needed (Heartburn). TAKE ONE CAPSULE BY MOUTH DAILY.       Patient Instructions   Continue medications as prescribed. Stay hydrated with water. Take sertraline 25 mg daily for seasonal depression. Follow-up in 6 months for recheck and fasting labs.       Medicare Wellness  Personal Prevention Plan of Service     Date of Office Visit:    Encounter Provider:  YUMIKO Vazquez  Place of Service:  [facility]  Patient Name: Alonzo Haque  :  1985    As part of the Medicare Wellness portion of your visit today, we are providing you with this personalized preventive plan of services (PPPS). This plan is based upon recommendations of the [place] Preventive Services Task Force (USPSTF) and the Advisory Committee on Immunization Practices (ACIP).    This lists the  preventive care services that should be considered, and provides dates of when you are due. Items listed as completed are up-to-date and do not require any further intervention.    Health Maintenance   Topic Date Due    Pneumococcal Vaccine 0-64 (1 of 2 - PCV) Never done    TDAP/TD VACCINES (1 - Tdap) Never done    INFLUENZA VACCINE  07/01/2024    COVID-19 Vaccine (3 - 2024-25 season) 09/01/2024    ANNUAL WELLNESS VISIT  12/13/2024    BMI FOLLOWUP  12/13/2024    LIPID PANEL  06/12/2025    HEPATITIS C SCREENING  Completed       No orders of the defined types were placed in this encounter.      Return in about 6 months (around 6/18/2025) for Recheck.                 Follow Up   No follow-ups on file.  Patient was given instructions and counseling regarding his condition or for health maintenance advice. Please see specific information pulled into the AVS if appropriate.

## 2025-06-30 ENCOUNTER — TELEPHONE (OUTPATIENT)
Dept: INTERNAL MEDICINE | Facility: CLINIC | Age: 40
End: 2025-06-30
Payer: MEDICARE

## 2025-06-30 NOTE — TELEPHONE ENCOUNTER
Caller: Alonzo Haque    Relationship: Self    Best call back number: [phone]     What medication are you requestin FR CATHETER SUPPLIES      If a prescription is needed, what is your preferred pharmacy and phone number:      [facility]     FAX# [phone]

## 2025-07-02 ENCOUNTER — OFFICE VISIT (OUTPATIENT)
Dept: INTERNAL MEDICINE | Facility: CLINIC | Age: 40
End: 2025-07-02
Payer: MEDICARE

## 2025-07-02 VITALS
HEART RATE: 81 BPM | WEIGHT: 155.2 LBS | DIASTOLIC BLOOD PRESSURE: 92 MMHG | TEMPERATURE: 97.6 F | OXYGEN SATURATION: 97 % | BODY MASS INDEX: 22.99 KG/M2 | SYSTOLIC BLOOD PRESSURE: 133 MMHG | HEIGHT: 69 IN

## 2025-07-02 DIAGNOSIS — K21.9 GASTROESOPHAGEAL REFLUX DISEASE WITHOUT ESOPHAGITIS: ICD-10-CM

## 2025-07-02 DIAGNOSIS — F34.1 DYSTHYMIA: Chronic | ICD-10-CM

## 2025-07-02 DIAGNOSIS — N31.9 NEUROGENIC BLADDER: Primary | Chronic | ICD-10-CM

## 2025-07-02 DIAGNOSIS — E78.2 MIXED HYPERLIPIDEMIA: Chronic | ICD-10-CM

## 2025-07-02 LAB
ALBUMIN SERPL-MCNC: 4.3 G/DL (ref 3.5–5.2)
ALBUMIN/GLOB SERPL: 1.8 G/DL
ALP SERPL-CCNC: 81 U/L (ref 39–117)
ALT SERPL-CCNC: 19 U/L (ref 1–41)
AST SERPL-CCNC: 20 U/L (ref 1–40)
BILIRUB SERPL-MCNC: 0.2 MG/DL (ref 0–1.2)
BUN SERPL-MCNC: 20 MG/DL (ref 6–20)
BUN/CREAT SERPL: 28.6 (ref 7–25)
CALCIUM SERPL-MCNC: 9.6 MG/DL (ref 8.6–10.5)
CHLORIDE SERPL-SCNC: 104 MMOL/L (ref 98–107)
CHOLEST SERPL-MCNC: 199 MG/DL (ref 0–200)
CHOLEST/HDLC SERPL: 5.1 {RATIO}
CO2 SERPL-SCNC: 26.4 MMOL/L (ref 22–29)
CREAT SERPL-MCNC: 0.7 MG/DL (ref 0.76–1.27)
EGFRCR SERPLBLD CKD-EPI 2021: 119.5 ML/MIN/1.73
GLOBULIN SER CALC-MCNC: 2.4 GM/DL
GLUCOSE SERPL-MCNC: 75 MG/DL (ref 65–99)
HDLC SERPL-MCNC: 39 MG/DL (ref 40–60)
LDLC SERPL CALC-MCNC: 145 MG/DL (ref 0–100)
POTASSIUM SERPL-SCNC: 4.8 MMOL/L (ref 3.5–5.2)
PROT SERPL-MCNC: 6.7 G/DL (ref 6–8.5)
SODIUM SERPL-SCNC: 140 MMOL/L (ref 136–145)
TRIGL SERPL-MCNC: 80 MG/DL (ref 0–150)
VLDLC SERPL CALC-MCNC: 15 MG/DL (ref 5–40)

## 2025-07-02 PROCEDURE — 1160F RVW MEDS BY RX/DR IN RCRD: CPT

## 2025-07-02 PROCEDURE — 99214 OFFICE O/P EST MOD 30 MIN: CPT

## 2025-07-02 PROCEDURE — 1126F AMNT PAIN NOTED NONE PRSNT: CPT

## 2025-07-02 PROCEDURE — 1159F MED LIST DOCD IN RCRD: CPT

## 2025-07-02 RX ORDER — SERTRALINE HYDROCHLORIDE 25 MG/1
25 TABLET, FILM COATED ORAL DAILY
Qty: 90 TABLET | Refills: 1 | Status: SHIPPED | OUTPATIENT
Start: 2025-07-02

## 2025-07-02 RX ORDER — ATORVASTATIN CALCIUM 40 MG/1
40 TABLET, FILM COATED ORAL DAILY
Qty: 90 TABLET | Refills: 1 | Status: SHIPPED | OUTPATIENT
Start: 2025-07-02

## 2025-07-02 RX ORDER — OMEPRAZOLE 40 MG/1
40 CAPSULE, DELAYED RELEASE ORAL DAILY PRN
Qty: 90 CAPSULE | Refills: 1 | Status: SHIPPED | OUTPATIENT
Start: 2025-07-02

## 2025-07-02 NOTE — PATIENT INSTRUCTIONS
Continue medications as prescribed.  Stay hydrated with water.  Labs today.  Recommend flu shot in the fall.  Follow-up in 6 months for Medicare wellness and fasting labs.

## 2025-07-03 ENCOUNTER — RESULTS FOLLOW-UP (OUTPATIENT)
Dept: INTERNAL MEDICINE | Facility: CLINIC | Age: 40
End: 2025-07-03
Payer: MEDICARE

## 2025-07-03 NOTE — PROGRESS NOTES
Metabolic panel is unremarkable.  No signs of liver injury, electrolyte imbalance or diabetes.  Kidney enzymes are slightly off.  Stay hydrated with water to help maintain overall kidney health.    Cholesterol levels have improved in the past year.  LDL remains elevated 145.  Goal of 100 or lower.  Previous level 172.  Total cholesterol has met goal.  Previously 224 and currently 199.  Continue atorvastatin as prescribed and maintain diet low in saturated fats.

## 2025-07-18 NOTE — TELEPHONE ENCOUNTER
Called pt and lvm to return call regarding lab results.     HUB TO RELAY     Metabolic panel is unremarkable.  No signs of liver injury, electrolyte imbalance or diabetes.  Kidney enzymes are slightly off.  Stay hydrated with water to help maintain overall kidney health.     Cholesterol levels have improved in the past year.  LDL remains elevated 145.  Goal of 100 or lower.  Previous level 172.  Total cholesterol has met goal.  Previously 224 and currently 199.  Continue atorvastatin as prescribed and maintain diet low in saturated fats